# Patient Record
Sex: FEMALE | Race: WHITE | NOT HISPANIC OR LATINO | Employment: FULL TIME | URBAN - METROPOLITAN AREA
[De-identification: names, ages, dates, MRNs, and addresses within clinical notes are randomized per-mention and may not be internally consistent; named-entity substitution may affect disease eponyms.]

---

## 2018-11-20 ENCOUNTER — APPOINTMENT (OUTPATIENT)
Dept: OBGYN | Facility: CLINIC | Age: 28
End: 2018-11-20
Payer: COMMERCIAL

## 2018-11-20 ENCOUNTER — PRENATAL OFFICE VISIT (OUTPATIENT)
Dept: OBGYN | Facility: CLINIC | Age: 28
End: 2018-11-20

## 2018-11-20 DIAGNOSIS — Z34.00 PRENATAL CARE, FIRST PREGNANCY: Primary | ICD-10-CM

## 2018-11-20 PROCEDURE — 99207 ZZC NO CHARGE NURSE ONLY: CPT | Performed by: OBSTETRICS & GYNECOLOGY

## 2018-11-20 RX ORDER — PRENATAL VIT/IRON FUM/FOLIC AC 27MG-0.8MG
1 TABLET ORAL DAILY
COMMUNITY

## 2018-11-28 ENCOUNTER — PRENATAL OFFICE VISIT (OUTPATIENT)
Dept: OBGYN | Facility: CLINIC | Age: 28
End: 2018-11-28
Payer: COMMERCIAL

## 2018-11-28 VITALS
BODY MASS INDEX: 28 KG/M2 | WEIGHT: 164 LBS | DIASTOLIC BLOOD PRESSURE: 78 MMHG | HEIGHT: 64 IN | HEART RATE: 87 BPM | TEMPERATURE: 97.8 F | RESPIRATION RATE: 16 BRPM | SYSTOLIC BLOOD PRESSURE: 132 MMHG

## 2018-11-28 DIAGNOSIS — Z23 NEED FOR PROPHYLACTIC VACCINATION AND INOCULATION AGAINST INFLUENZA: ICD-10-CM

## 2018-11-28 DIAGNOSIS — Z34.01 ENCOUNTER FOR PRENATAL CARE IN FIRST TRIMESTER OF FIRST PREGNANCY: Primary | ICD-10-CM

## 2018-11-28 LAB
ABO + RH BLD: NORMAL
ABO + RH BLD: NORMAL
ALBUMIN UR-MCNC: NEGATIVE MG/DL
AMORPH CRY #/AREA URNS HPF: ABNORMAL /HPF
APPEARANCE UR: ABNORMAL
BILIRUB UR QL STRIP: NEGATIVE
BLD GP AB SCN SERPL QL: NORMAL
BLOOD BANK CMNT PATIENT-IMP: NORMAL
COLOR UR AUTO: YELLOW
ERYTHROCYTE [DISTWIDTH] IN BLOOD BY AUTOMATED COUNT: 11.4 % (ref 10–15)
GLUCOSE UR STRIP-MCNC: NEGATIVE MG/DL
HCT VFR BLD AUTO: 38.1 % (ref 35–47)
HGB BLD-MCNC: 13.2 G/DL (ref 11.7–15.7)
HGB UR QL STRIP: ABNORMAL
KETONES UR STRIP-MCNC: NEGATIVE MG/DL
LEUKOCYTE ESTERASE UR QL STRIP: NEGATIVE
MCH RBC QN AUTO: 31.2 PG (ref 26.5–33)
MCHC RBC AUTO-ENTMCNC: 34.6 G/DL (ref 31.5–36.5)
MCV RBC AUTO: 90 FL (ref 78–100)
NITRATE UR QL: NEGATIVE
NON-SQ EPI CELLS #/AREA URNS LPF: ABNORMAL /LPF
PH UR STRIP: 7 PH (ref 5–7)
PLATELET # BLD AUTO: 277 10E9/L (ref 150–450)
RBC # BLD AUTO: 4.23 10E12/L (ref 3.8–5.2)
RBC #/AREA URNS AUTO: ABNORMAL /HPF
SOURCE: ABNORMAL
SP GR UR STRIP: 1.02 (ref 1–1.03)
SPECIMEN EXP DATE BLD: NORMAL
UROBILINOGEN UR STRIP-ACNC: 0.2 EU/DL (ref 0.2–1)
WBC # BLD AUTO: 9.9 10E9/L (ref 4–11)
WBC #/AREA URNS AUTO: ABNORMAL /HPF

## 2018-11-28 PROCEDURE — 86901 BLOOD TYPING SEROLOGIC RH(D): CPT | Performed by: OBSTETRICS & GYNECOLOGY

## 2018-11-28 PROCEDURE — 87389 HIV-1 AG W/HIV-1&-2 AB AG IA: CPT | Performed by: OBSTETRICS & GYNECOLOGY

## 2018-11-28 PROCEDURE — 90686 IIV4 VACC NO PRSV 0.5 ML IM: CPT | Performed by: OBSTETRICS & GYNECOLOGY

## 2018-11-28 PROCEDURE — 81001 URINALYSIS AUTO W/SCOPE: CPT | Performed by: OBSTETRICS & GYNECOLOGY

## 2018-11-28 PROCEDURE — 87491 CHLMYD TRACH DNA AMP PROBE: CPT | Performed by: OBSTETRICS & GYNECOLOGY

## 2018-11-28 PROCEDURE — 99207 ZZC FIRST OB VISIT: CPT | Performed by: OBSTETRICS & GYNECOLOGY

## 2018-11-28 PROCEDURE — 90471 IMMUNIZATION ADMIN: CPT | Performed by: OBSTETRICS & GYNECOLOGY

## 2018-11-28 PROCEDURE — 86780 TREPONEMA PALLIDUM: CPT | Performed by: OBSTETRICS & GYNECOLOGY

## 2018-11-28 PROCEDURE — G0145 SCR C/V CYTO,THINLAYER,RESCR: HCPCS | Performed by: OBSTETRICS & GYNECOLOGY

## 2018-11-28 PROCEDURE — 85027 COMPLETE CBC AUTOMATED: CPT | Performed by: OBSTETRICS & GYNECOLOGY

## 2018-11-28 PROCEDURE — 86850 RBC ANTIBODY SCREEN: CPT | Performed by: OBSTETRICS & GYNECOLOGY

## 2018-11-28 PROCEDURE — 87086 URINE CULTURE/COLONY COUNT: CPT | Performed by: OBSTETRICS & GYNECOLOGY

## 2018-11-28 PROCEDURE — 87591 N.GONORRHOEAE DNA AMP PROB: CPT | Performed by: OBSTETRICS & GYNECOLOGY

## 2018-11-28 PROCEDURE — 86762 RUBELLA ANTIBODY: CPT | Performed by: OBSTETRICS & GYNECOLOGY

## 2018-11-28 PROCEDURE — 86900 BLOOD TYPING SEROLOGIC ABO: CPT | Performed by: OBSTETRICS & GYNECOLOGY

## 2018-11-28 PROCEDURE — 87340 HEPATITIS B SURFACE AG IA: CPT | Performed by: OBSTETRICS & GYNECOLOGY

## 2018-11-28 PROCEDURE — 36415 COLL VENOUS BLD VENIPUNCTURE: CPT | Performed by: OBSTETRICS & GYNECOLOGY

## 2018-11-28 NOTE — PROGRESS NOTES

## 2018-11-28 NOTE — NURSING NOTE
"Initial /78 (BP Location: Right arm, Patient Position: Chair, Cuff Size: Adult Regular)  Pulse 87  Temp 97.8  F (36.6  C) (Tympanic)  Resp 16  Ht 5' 4\" (1.626 m)  Wt 164 lb (74.4 kg)  BMI 28.15 kg/m2 Estimated body mass index is 28.15 kg/(m^2) as calculated from the following:    Height as of this encounter: 5' 4\" (1.626 m).    Weight as of this encounter: 164 lb (74.4 kg). .      "

## 2018-11-28 NOTE — PROGRESS NOTES
[facility]   OB/GYN Clinic    CC: New OB     Subjective:    Darlene is a 28 year old  at 10w5d by stated MARIA ALEJANDRA based on embryo transfer date who presents for her initial OB visit. This is a unplanned pregnancy and her and her partner are excited. She did IVF through [facility]. Dr. Veras was her CESAR. She reports feeling well. Denies any uterine cramping, abdominal pain or vaginal bleeding. Has nausea and no vomiting. Did single embryo transfer. Her first transfer failed. This was her 2nd transfer.     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       Past Surgical History:   Procedure Laterality Date     SURGICAL HISTORY OF -       egg retrieval for IVF       Current Outpatient Prescriptions   Medication Sig Dispense Refill     aspirin 81 MG tablet Take 81 mg by mouth daily       FOLIC ACID PO Take 400 mcg by mouth daily       Prenatal Vit-Fe Fumarate-FA (PRENATAL MULTIVITAMIN PLUS IRON) 27-0.8 MG TABS per tablet Take 1 tablet by mouth daily       VITAMIN D, CHOLECALCIFEROL, PO Take 2,000 Units by mouth daily         Family History   Problem Relation Age of Onset     Heart Surgery Maternal Grandmother      Melanoma Maternal Grandfather      Coronary Artery Disease Paternal Grandfather      MI       Social History   Substance Use Topics     Smoking status: Never Smoker     Smokeless tobacco: Never Used     Alcohol use Yes      Comment: 1-2- drink weekly quit with pregnancy       ROS: A 10 pt ROS was completed and found to be negative unless mentioned in the HPI.     Objective:  /78 (BP Location: Right arm, Patient Position: Chair, Cuff Size: Adult Regular)  Pulse 87  Temp 97.8  F (36.6  C) (Tympanic)  Resp 16  Ht 5'  "4\" (1.626 m)  Wt 164 lb (74.4 kg)  BMI 28.15 kg/m2    Estimated body mass index is 28.15 kg/(m^2) as calculated from the following:    Height as of this encounter: 5' 4\" (1.626 m).    Weight as of this encounter: 164 lb (74.4 kg).    Physical Exam:  Gen: Pleasant, talkative female in no apparent distress   Respiratory: Lungs clear, breathing comfortably on room air   Cardiac: Regular rate and rhythm with no murmurs, gallops or rubs. Warm and well-perfused.   GI: Abd soft and non-tender  : External genitalia is free of lesion. Urethra and bartholin glands normal.  Vaginal mucosa is moist and pink without unusual discharge.  Cervix is without lesions or discharge.  Bimanual exam reveals mobile anteverted uterus without cervical motion tenderness.  Adenexa are mobile and non-tender bilaterally. No appreciable adnexal enlargement. Uterus is consistent with a 11 week gestation.   MSK: Grossly normal movement of all four extremities  Psych: mood and affect bright   Lower extremity: edema not present     Fetal dop tones: 150 bpm  Fundal height: c/w dates    Assessment/Plan:   28 year old  at 10w5d by stated MARIA ALEJANDRA from embryo transfer date who presents for her initial OB visit.   1) Plan to draw new OB lab today (T&S, CBC, HIV, RPR, HepBsAg, Hep B antibody, rubella, GC/Chlam, UC)  2) Discussed routine prenatal care, group practice model at [facility], tertiary support and frequency of visits. Options for  testing for chromosomal and birth defects were discussed with the patient including nuchal translucency/blood marker testing in the first trimester, progenity and quad screening and/or Level 2 ultrasound in the second trimester. We discussed that these are screening tests and not diagnostic tests and that false positives and negatives are a distinct possibility. We discussed that follow up diagnostic testing would include chorionic villus sampling or amniocentesis depending on gestational age. Written " information provided. Patient declines genetic testing. Discussed risk of zika infection with travel and subsequent pregnancy risks. Referred to CDC for further information.   3) Anatomy scan at 20wks  4) Concerns: IVF pregnancy - offered fetal ECHO 2nd trimester, pt will discuss with partner  5) PMH/OBHx problems: none  6) Medication review: no changes, continue prenatal vitamin   7) Reviewed ectopic and miscarriage precautions (present to ED or call clinic with abdominal pain, vaginal bleeding or fever)   8) Weight gain: discussed weight gain expectations (BMI 25-30: 15-25lbs)   9) PAP smear: due today  10) Delivery plan: continue to discuss, breastfeeding, pp contraception, pain management in labor - continue to discuss  11) Immunizations: flu shot - due today, Tdap at 28wks  12) No increased risk for gestational diabetes for plan for screen at 28wks     Return to clinic in 4 weeks.     Dinah Rossi MD  OB/GYN  11/28/2018

## 2018-11-28 NOTE — PATIENT INSTRUCTIONS
1) Have your flu shot.   2) We will escort you down to the lab to have your blood drawn.   3) Return to clinic in 4 weeks.   Congratulations! It was a pleasure to meet you!    - Dr. Rossi

## 2018-11-28 NOTE — LETTER
December 4, 2018      Darlene Johnson  [id number] [address] [facility] [address]    Dear ,      I am happy to inform you that your recent cervical cancer screening test (PAP smear) was normal.      Preventative screenings such as this help to ensure your health for years to come. You should repeat a pap smear in 3 years, unless otherwise directed.      You will still need to return to the clinic every year for your annual exam and other preventive tests.     If you have additional questions regarding this result, please call our registered nurse, Yani at [phone].      Sincerely,      Dinah Rossi MD/yoshi

## 2018-11-28 NOTE — MR AVS SNAPSHOT
After Visit Summary   11/28/2018    Darlene Johnson    MRN: [id number]           Patient Information     Date Of Birth          1990        Visit Information        Provider Department      11/28/2018 2:00 PM Dinah Rossi MD; [facility]        Today's Diagnoses     NO SHOW    -  1      Care Instructions    1) Have your flu shot.   2) We will escort you down to the lab to have your blood drawn.   3) Return to clinic in 4 weeks.   Congratulations! It was a pleasure to meet you!    - Dr. Rossi           Follow-ups after your visit        Who to contact     If you have questions or need follow up information about today's clinic visit or your schedule please contact [facility] directly at [phone].  Normal or non-critical lab and imaging results will be communicated to you by MyChart, letter or phone within 4 business days after the clinic has received the results. If you do not hear from us within 7 days, please contact the clinic through MyChart or phone. If you have a critical or abnormal lab result, we will notify you by phone as soon as possible.  Submit refill requests through Accountable or call your pharmacy and they will forward the refill request to us. Please allow 3 business days for your refill to be completed.          Additional Information About Your Visit        MyChart Information     Accountable gives you secure access to your electronic health record. If you see a primary care provider, you can also send messages to your care team and make appointments. If you have questions, please call your primary care clinic.  If you do not have a primary care provider, please call [phone] and they will assist you.        Care EveryWhere ID     This is your Care EveryWhere ID. This could be used by other organizations to access your [place] medical records  [id number]        Your Vitals Were     Pulse Temperature Respirations Height  "BMI (Body Mass Index)       87 97.8  F (36.6  C) (Tympanic) 16 5' 4\" (1.626 m) 28.15 kg/m2        Blood Pressure from Last 3 Encounters:   11/28/18 132/78    Weight from Last 3 Encounters:   11/28/18 164 lb (74.4 kg)              We Performed the Following     NO SHOW CHARGE        Primary Care Provider    None Specified       No primary provider on file.        Equal Access to Services     ABI MCCRACKEN : Hadii sergio dahlo Socosta, waaxda luqadaha, qaybta kaalmada elvia, minal jeffersonalishalorena jalloh . So [facility] [phone].    ATENCIÓN: Si aleah hernandez, tiene a riley disposición servicios gratuitos de asistencia lingüística. Llame al [phone].    We comply with applicable federal civil rights laws and [place] laws. We do not discriminate on the basis of race, color, national origin, age, disability, sex, sexual orientation, or gender identity.            Thank you!     Thank you for choosing [facility]  for your care. Our goal is always to provide you with excellent care. Hearing back from our patients is one way we can continue to improve our services. Please take a few minutes to complete the written survey that you may receive in the mail after your visit with us. Thank you!             Your Updated Medication List - Protect others around you: Learn how to safely use, store and throw away your medicines at www.disposemymeds.org.          This list is accurate as of 11/28/18  2:55 PM.  Always use your most recent med list.                   Brand Name Dispense Instructions for use Diagnosis    aspirin 81 MG tablet    ASA     Take 81 mg by mouth daily        FOLIC ACID PO      Take 400 mcg by mouth daily        prenatal multivitamin w/iron 27-0.8 MG tablet      Take 1 tablet by mouth daily        VITAMIN D (CHOLECALCIFEROL) PO      Take 2,000 Units by mouth daily          "

## 2018-11-29 LAB
BACTERIA SPEC CULT: NO GROWTH
C TRACH DNA SPEC QL NAA+PROBE: NEGATIVE
HBV SURFACE AG SERPL QL IA: NONREACTIVE
HIV 1+2 AB+HIV1 P24 AG SERPL QL IA: NONREACTIVE
Lab: NORMAL
N GONORRHOEA DNA SPEC QL NAA+PROBE: NEGATIVE
RUBV IGG SERPL IA-ACNC: 16 IU/ML
SPECIMEN SOURCE: NORMAL
T PALLIDUM AB SER QL: NONREACTIVE

## 2018-11-30 LAB
COPATH REPORT: NORMAL
PAP: NORMAL

## 2018-12-31 ENCOUNTER — PRENATAL OFFICE VISIT (OUTPATIENT)
Dept: OBGYN | Facility: CLINIC | Age: 28
End: 2018-12-31
Payer: COMMERCIAL

## 2018-12-31 VITALS
SYSTOLIC BLOOD PRESSURE: 109 MMHG | WEIGHT: 166 LBS | HEART RATE: 68 BPM | TEMPERATURE: 97.6 F | BODY MASS INDEX: 28.49 KG/M2 | DIASTOLIC BLOOD PRESSURE: 62 MMHG

## 2018-12-31 DIAGNOSIS — Z34.02 ENCOUNTER FOR PRENATAL CARE IN SECOND TRIMESTER OF FIRST PREGNANCY: Primary | ICD-10-CM

## 2018-12-31 DIAGNOSIS — Z34.02 ENCOUNTER FOR SUPERVISION OF NORMAL FIRST PREGNANCY IN SECOND TRIMESTER: ICD-10-CM

## 2018-12-31 PROCEDURE — 99207 ZZC PRENATAL VISIT: CPT | Performed by: OBSTETRICS & GYNECOLOGY

## 2018-12-31 NOTE — PROGRESS NOTES
Concerns:   Doing well.  No concerns today.  Discussed anenatal screening.  She declines testing at this time.  Reportable signs and symptoms discussed.  Will schedule anatomy ultrasound.  RTC 4 weeks.      Miguel Kellogg MD

## 2018-12-31 NOTE — NURSING NOTE
"Initial /62 (BP Location: Right arm, Patient Position: Chair, Cuff Size: Adult Large)   Pulse 68   Temp 97.6  F (36.4  C) (Tympanic)   Wt 75.3 kg (166 lb)   BMI 28.49 kg/m   Estimated body mass index is 28.49 kg/m  as calculated from the following:    Height as of 11/28/18: 1.626 m (5' 4\").    Weight as of this encounter: 75.3 kg (166 lb). .    Swapna Ugalde    "

## 2019-02-01 ENCOUNTER — HOSPITAL ENCOUNTER (OUTPATIENT)
Dept: ULTRASOUND IMAGING | Facility: CLINIC | Age: 29
Discharge: HOME | End: 2019-02-01
Attending: OBSTETRICS & GYNECOLOGY | Admitting: OBSTETRICS & GYNECOLOGY
Payer: COMMERCIAL

## 2019-02-01 ENCOUNTER — PRENATAL OFFICE VISIT (OUTPATIENT)
Dept: OBGYN | Facility: CLINIC | Age: 29
End: 2019-02-01
Payer: COMMERCIAL

## 2019-02-01 VITALS
DIASTOLIC BLOOD PRESSURE: 70 MMHG | SYSTOLIC BLOOD PRESSURE: 122 MMHG | BODY MASS INDEX: 29.64 KG/M2 | HEIGHT: 64 IN | TEMPERATURE: 98.3 F | WEIGHT: 173.6 LBS | RESPIRATION RATE: 16 BRPM | HEART RATE: 68 BPM

## 2019-02-01 DIAGNOSIS — Z34.02 ENCOUNTER FOR PRENATAL CARE IN SECOND TRIMESTER OF FIRST PREGNANCY: ICD-10-CM

## 2019-02-01 DIAGNOSIS — Z34.02 ENCOUNTER FOR PRENATAL CARE IN SECOND TRIMESTER OF FIRST PREGNANCY: Primary | ICD-10-CM

## 2019-02-01 PROCEDURE — 99207 ZZC PRENATAL VISIT: CPT | Performed by: OBSTETRICS & GYNECOLOGY

## 2019-02-01 PROCEDURE — 76805 OB US >/= 14 WKS SNGL FETUS: CPT

## 2019-02-01 ASSESSMENT — MIFFLIN-ST. JEOR: SCORE: 1502.44

## 2019-02-01 NOTE — PROGRESS NOTES
Doing well, had anatomy US today and has gender in an envelope.  No FM yet.     28 year old  at 20w0d   - anatomy US today: final result not yet in Epic  - reviewed quickening  - GCT next visit    RTC 4 weeks    Shira Brand MD, MPH  [facility]

## 2019-02-01 NOTE — NURSING NOTE
"Initial /70 (BP Location: Right arm, Patient Position: Sitting, Cuff Size: Adult Regular)   Pulse 68   Temp 98.3  F (36.8  C) (Tympanic)   Resp 16   Ht 1.626 m (5' 4\")   Wt 78.7 kg (173 lb 9.6 oz)   Breastfeeding? No   BMI 29.80 kg/m   Estimated body mass index is 29.8 kg/m  as calculated from the following:    Height as of this encounter: 1.626 m (5' 4\").    Weight as of this encounter: 78.7 kg (173 lb 9.6 oz). .    Yani Lee, [facility]    "

## 2019-02-26 ENCOUNTER — PRENATAL OFFICE VISIT (OUTPATIENT)
Dept: OBGYN | Facility: CLINIC | Age: 29
End: 2019-02-26
Payer: COMMERCIAL

## 2019-02-26 VITALS
HEIGHT: 64 IN | DIASTOLIC BLOOD PRESSURE: 56 MMHG | RESPIRATION RATE: 16 BRPM | HEART RATE: 71 BPM | WEIGHT: 178.2 LBS | SYSTOLIC BLOOD PRESSURE: 121 MMHG | TEMPERATURE: 98.1 F | BODY MASS INDEX: 30.42 KG/M2

## 2019-02-26 DIAGNOSIS — Z34.02 ENCOUNTER FOR PRENATAL CARE IN SECOND TRIMESTER OF FIRST PREGNANCY: Primary | ICD-10-CM

## 2019-02-26 DIAGNOSIS — Z34.02 ENCOUNTER FOR PRENATAL CARE IN SECOND TRIMESTER OF FIRST PREGNANCY: ICD-10-CM

## 2019-02-26 LAB
ERYTHROCYTE [DISTWIDTH] IN BLOOD BY AUTOMATED COUNT: 11.8 % (ref 10–15)
GLUCOSE 1H P 50 G GLC PO SERPL-MCNC: 85 MG/DL (ref 60–129)
HCT VFR BLD AUTO: 34.5 % (ref 35–47)
HGB BLD-MCNC: 11.9 G/DL (ref 11.7–15.7)
MCH RBC QN AUTO: 32.5 PG (ref 26.5–33)
MCHC RBC AUTO-ENTMCNC: 34.5 G/DL (ref 31.5–36.5)
MCV RBC AUTO: 94 FL (ref 78–100)
PLATELET # BLD AUTO: 231 10E9/L (ref 150–450)
RBC # BLD AUTO: 3.66 10E12/L (ref 3.8–5.2)
WBC # BLD AUTO: 10.2 10E9/L (ref 4–11)

## 2019-02-26 PROCEDURE — 0064U ANTB TP TOTAL&RPR IA QUAL: CPT | Performed by: OBSTETRICS & GYNECOLOGY

## 2019-02-26 PROCEDURE — 85027 COMPLETE CBC AUTOMATED: CPT | Performed by: OBSTETRICS & GYNECOLOGY

## 2019-02-26 PROCEDURE — 99207 ZZC PRENATAL VISIT: CPT | Performed by: OBSTETRICS & GYNECOLOGY

## 2019-02-26 PROCEDURE — 82950 GLUCOSE TEST: CPT | Performed by: OBSTETRICS & GYNECOLOGY

## 2019-02-26 PROCEDURE — 36415 COLL VENOUS BLD VENIPUNCTURE: CPT | Performed by: OBSTETRICS & GYNECOLOGY

## 2019-02-26 ASSESSMENT — MIFFLIN-ST. JEOR: SCORE: 1523.31

## 2019-02-26 NOTE — NURSING NOTE
"Initial /56 (BP Location: Right arm, Patient Position: Sitting, Cuff Size: Adult Regular)   Pulse 71   Temp 98.1  F (36.7  C) (Tympanic)   Resp 16   Ht 1.626 m (5' 4\")   Wt 80.8 kg (178 lb 3.2 oz)   Breastfeeding? No   BMI 30.59 kg/m   Estimated body mass index is 30.59 kg/m  as calculated from the following:    Height as of this encounter: 1.626 m (5' 4\").    Weight as of this encounter: 80.8 kg (178 lb 3.2 oz). .    Yani Lee, [facility]    "

## 2019-02-26 NOTE — PROGRESS NOTES
Doing well.  +FM, no ctx, no VB or LOF.    28 year old  at 23w4d   - GCT today  - TDaP next visit  - gave printed results of US; it's a GIRL    RTC 4 weeks    Shira Brand MD, MPH  [facility]

## 2019-02-27 LAB — T PALLIDUM AB SER QL: NONREACTIVE

## 2019-03-26 ENCOUNTER — PRENATAL OFFICE VISIT (OUTPATIENT)
Dept: OBGYN | Facility: CLINIC | Age: 29
End: 2019-03-26
Payer: COMMERCIAL

## 2019-03-26 VITALS
WEIGHT: 185.56 LBS | BODY MASS INDEX: 31.68 KG/M2 | DIASTOLIC BLOOD PRESSURE: 69 MMHG | HEART RATE: 75 BPM | TEMPERATURE: 98.4 F | HEIGHT: 64 IN | SYSTOLIC BLOOD PRESSURE: 118 MMHG | RESPIRATION RATE: 16 BRPM

## 2019-03-26 DIAGNOSIS — Z34.02 ENCOUNTER FOR PRENATAL CARE IN SECOND TRIMESTER OF FIRST PREGNANCY: Primary | ICD-10-CM

## 2019-03-26 PROCEDURE — 99207 ZZC PRENATAL VISIT: CPT | Performed by: OBSTETRICS & GYNECOLOGY

## 2019-03-26 PROCEDURE — 90715 TDAP VACCINE 7 YRS/> IM: CPT | Performed by: OBSTETRICS & GYNECOLOGY

## 2019-03-26 PROCEDURE — 90471 IMMUNIZATION ADMIN: CPT | Performed by: OBSTETRICS & GYNECOLOGY

## 2019-03-26 ASSESSMENT — MIFFLIN-ST. JEOR: SCORE: 1556.69

## 2019-03-26 NOTE — NURSING NOTE
"Initial /69 (BP Location: Right arm, Patient Position: Sitting, Cuff Size: Adult Regular)   Pulse 75   Temp 98.4  F (36.9  C) (Tympanic)   Resp 16   Ht 1.626 m (5' 4\")   Wt 84.2 kg (185 lb 9 oz)   Breastfeeding? No   BMI 31.85 kg/m   Estimated body mass index is 31.85 kg/m  as calculated from the following:    Height as of this encounter: 1.626 m (5' 4\").    Weight as of this encounter: 84.2 kg (185 lb 9 oz). .    Yani Lee, [facility]    "

## 2019-03-26 NOTE — PROGRESS NOTES
Doing well.  +FM, no ctx, no VB or LOF.    28 year old  at 27w4d   - TDaP today  - planning to breastfeed, considering LARC postpartum.  Had IVF for low sperm count.   - reviewed s/s PTL, gave # for BC    RTC 2 weeks    Shira Brand MD, MPH  [facility]

## 2019-04-09 ENCOUNTER — PRENATAL OFFICE VISIT (OUTPATIENT)
Dept: OBGYN | Facility: CLINIC | Age: 29
End: 2019-04-09
Payer: COMMERCIAL

## 2019-04-09 VITALS
HEIGHT: 64 IN | HEART RATE: 80 BPM | SYSTOLIC BLOOD PRESSURE: 111 MMHG | WEIGHT: 185.9 LBS | TEMPERATURE: 97.1 F | BODY MASS INDEX: 31.74 KG/M2 | RESPIRATION RATE: 16 BRPM | DIASTOLIC BLOOD PRESSURE: 63 MMHG

## 2019-04-09 DIAGNOSIS — Z34.03 ENCOUNTER FOR PRENATAL CARE IN THIRD TRIMESTER OF FIRST PREGNANCY: Primary | ICD-10-CM

## 2019-04-09 PROCEDURE — 99207 ZZC PRENATAL VISIT: CPT | Performed by: OBSTETRICS & GYNECOLOGY

## 2019-04-09 ASSESSMENT — MIFFLIN-ST. JEOR: SCORE: 1558.24

## 2019-04-09 NOTE — PROGRESS NOTES
Doing well, has two baby showers coming up.  +FM, no ctx, no VB or LOF.    28 year old  at 29w4d   - s/p TDaP last visit  - discussed GBS and cvx chk at 36 week visit     RTC 2 weeks    Shira Brand MD, MPH  [facility]

## 2019-04-09 NOTE — NURSING NOTE
"Initial /63 (BP Location: Right arm, Patient Position: Sitting, Cuff Size: Adult Regular)   Pulse 80   Temp 97.1  F (36.2  C) (Tympanic)   Resp 16   Ht 1.626 m (5' 4\")   Wt 84.3 kg (185 lb 14.4 oz)   Breastfeeding? No   BMI 31.91 kg/m   Estimated body mass index is 31.91 kg/m  as calculated from the following:    Height as of this encounter: 1.626 m (5' 4\").    Weight as of this encounter: 84.3 kg (185 lb 14.4 oz). .    Yani Lee, [facility]    "

## 2019-04-23 ENCOUNTER — PRENATAL OFFICE VISIT (OUTPATIENT)
Dept: OBGYN | Facility: CLINIC | Age: 29
End: 2019-04-23
Payer: COMMERCIAL

## 2019-04-23 VITALS
HEART RATE: 78 BPM | BODY MASS INDEX: 32.44 KG/M2 | WEIGHT: 189 LBS | DIASTOLIC BLOOD PRESSURE: 64 MMHG | TEMPERATURE: 96.2 F | SYSTOLIC BLOOD PRESSURE: 129 MMHG

## 2019-04-23 DIAGNOSIS — Z34.03 ENCOUNTER FOR PRENATAL CARE IN THIRD TRIMESTER OF FIRST PREGNANCY: Primary | ICD-10-CM

## 2019-04-23 PROCEDURE — 99207 ZZC PRENATAL VISIT: CPT | Performed by: OBSTETRICS & GYNECOLOGY

## 2019-04-23 NOTE — NURSING NOTE
"Initial /64 (BP Location: Left arm, Patient Position: Chair, Cuff Size: Adult Regular)   Pulse 78   Temp 96.2  F (35.7  C) (Tympanic)   Wt 85.7 kg (189 lb)   BMI 32.44 kg/m   Estimated body mass index is 32.44 kg/m  as calculated from the following:    Height as of 4/9/19: 1.626 m (5' 4\").    Weight as of this encounter: 85.7 kg (189 lb). .    Swapna Ugalde    "

## 2019-04-23 NOTE — PROGRESS NOTES
Doing well.   Had a cough start over the weekend; inquires about OTC meds that she is okay to take.   Denies VB, ctx, LOF. +FM  /64 (BP Location: Left arm, Patient Position: Chair, Cuff Size: Adult Regular)   Pulse 78   Temp 96.2  F (35.7  C) (Tympanic)   Wt 85.7 kg (189 lb)   BMI 32.44 kg/m    General Appearance: NAD  Abdomen: Gravid, NT  Refer to flow sheet above.   A/P: 28 year old  at 31w4d  -- concerns addressed above, reassurance provided; conservative OTC meds reviewed   -- growth US ordered for S>D  -- PTL precautions reviewed  RTC in 2 weeks    Concetta Darby MD  [facility]

## 2019-04-26 ENCOUNTER — HOSPITAL ENCOUNTER (OUTPATIENT)
Dept: ULTRASOUND IMAGING | Facility: CLINIC | Age: 29
Discharge: HOME | End: 2019-04-26
Attending: OBSTETRICS & GYNECOLOGY | Admitting: OBSTETRICS & GYNECOLOGY
Payer: COMMERCIAL

## 2019-04-26 DIAGNOSIS — Z34.03 ENCOUNTER FOR PRENATAL CARE IN THIRD TRIMESTER OF FIRST PREGNANCY: ICD-10-CM

## 2019-04-26 PROCEDURE — 76816 OB US FOLLOW-UP PER FETUS: CPT

## 2019-05-07 ENCOUNTER — PRENATAL OFFICE VISIT (OUTPATIENT)
Dept: OBGYN | Facility: CLINIC | Age: 29
End: 2019-05-07
Payer: COMMERCIAL

## 2019-05-07 VITALS
DIASTOLIC BLOOD PRESSURE: 80 MMHG | HEIGHT: 64 IN | TEMPERATURE: 98.2 F | SYSTOLIC BLOOD PRESSURE: 116 MMHG | WEIGHT: 190.4 LBS | RESPIRATION RATE: 16 BRPM | BODY MASS INDEX: 32.5 KG/M2 | HEART RATE: 86 BPM

## 2019-05-07 DIAGNOSIS — Z34.03 ENCOUNTER FOR PRENATAL CARE IN THIRD TRIMESTER OF FIRST PREGNANCY: Primary | ICD-10-CM

## 2019-05-07 PROCEDURE — 99207 ZZC PRENATAL VISIT: CPT | Performed by: OBSTETRICS & GYNECOLOGY

## 2019-05-07 ASSESSMENT — MIFFLIN-ST. JEOR: SCORE: 1578.65

## 2019-05-07 NOTE — NURSING NOTE
"Initial /80 (BP Location: Right arm, Patient Position: Sitting, Cuff Size: Adult Regular)   Pulse 86   Temp 98.2  F (36.8  C) (Tympanic)   Resp 16   Ht 1.626 m (5' 4\")   Wt 86.4 kg (190 lb 6.4 oz)   Breastfeeding? No   BMI 32.68 kg/m   Estimated body mass index is 32.68 kg/m  as calculated from the following:    Height as of this encounter: 1.626 m (5' 4\").    Weight as of this encounter: 86.4 kg (190 lb 6.4 oz). .    Yani Lee, [facility]    "

## 2019-05-07 NOTE — PROGRESS NOTES
Doing well.  +FM, no ctx, no VB or LOF.    28 year old  at 33w4d   - GBS and cvx chk next visit  - reviewed s/s labor    RTC 2 weeks    Shira Brand MD, MPH  [facility]

## 2019-05-21 ENCOUNTER — PRENATAL OFFICE VISIT (OUTPATIENT)
Dept: OBGYN | Facility: CLINIC | Age: 29
End: 2019-05-21
Payer: COMMERCIAL

## 2019-05-21 VITALS
DIASTOLIC BLOOD PRESSURE: 68 MMHG | HEIGHT: 64 IN | TEMPERATURE: 98.1 F | HEART RATE: 73 BPM | WEIGHT: 195.2 LBS | SYSTOLIC BLOOD PRESSURE: 123 MMHG | RESPIRATION RATE: 16 BRPM | BODY MASS INDEX: 33.32 KG/M2

## 2019-05-21 DIAGNOSIS — Z34.03 ENCOUNTER FOR PRENATAL CARE IN THIRD TRIMESTER OF FIRST PREGNANCY: Primary | ICD-10-CM

## 2019-05-21 PROCEDURE — 87653 STREP B DNA AMP PROBE: CPT | Performed by: OBSTETRICS & GYNECOLOGY

## 2019-05-21 PROCEDURE — 99207 ZZC PRENATAL VISIT: CPT | Performed by: OBSTETRICS & GYNECOLOGY

## 2019-05-21 ASSESSMENT — MIFFLIN-ST. JEOR: SCORE: 1595.42

## 2019-05-21 NOTE — PROGRESS NOTES
Doing well.  +FM, occ runs of ctx, no VB or LOF.    29 year old  at 35w4d   - GBS and cvx chk today  - discussed IOL after 41 weeks for post-dates or by maternal request with a favorable cervix after 39 weeks.  Discussed with Darlene Johnson, the following; indications; the agents and methods of labor augmentation, including risks, benefits, and alternative approaches; and the possible need for  birth. EFW is AGA.  The Labor Induction:what you need to know information sheet was made available to her. Questions and concerns were addressed and patient agrees to above if necessary during the course of her labor.      Shira Brand MD, MPH  [facility]

## 2019-05-21 NOTE — NURSING NOTE
"Initial /68 (BP Location: Right arm, Patient Position: Sitting, Cuff Size: Adult Regular)   Pulse 73   Temp 98.1  F (36.7  C)   Resp 16   Ht 1.626 m (5' 4\")   Wt 88.5 kg (195 lb 3.2 oz)   Breastfeeding? No   BMI 33.51 kg/m   Estimated body mass index is 33.51 kg/m  as calculated from the following:    Height as of this encounter: 1.626 m (5' 4\").    Weight as of this encounter: 88.5 kg (195 lb 3.2 oz). .    Yani Lee, [facility]    "

## 2019-05-22 LAB
GP B STREP DNA SPEC QL NAA+PROBE: NEGATIVE
SPECIMEN SOURCE: NORMAL

## 2019-05-28 ENCOUNTER — PRENATAL OFFICE VISIT (OUTPATIENT)
Dept: OBGYN | Facility: CLINIC | Age: 29
End: 2019-05-28
Payer: COMMERCIAL

## 2019-05-28 VITALS
BODY MASS INDEX: 33.36 KG/M2 | WEIGHT: 195.4 LBS | RESPIRATION RATE: 16 BRPM | HEART RATE: 75 BPM | TEMPERATURE: 97.4 F | SYSTOLIC BLOOD PRESSURE: 127 MMHG | DIASTOLIC BLOOD PRESSURE: 76 MMHG | HEIGHT: 64 IN

## 2019-05-28 DIAGNOSIS — Z34.03 ENCOUNTER FOR PRENATAL CARE IN THIRD TRIMESTER OF FIRST PREGNANCY: Primary | ICD-10-CM

## 2019-05-28 PROCEDURE — 99207 ZZC PRENATAL VISIT: CPT | Performed by: OBSTETRICS & GYNECOLOGY

## 2019-05-28 ASSESSMENT — MIFFLIN-ST. JEOR: SCORE: 1596.33

## 2019-05-28 NOTE — PROGRESS NOTES
Doing well, had a grad party out of town last weekend.  +FM, no real ctx, no VB or LOF.    29 year old  at 36w4d   - reviewed s/s labor, has # for BC    RTC weekly    Shira Brand MD, MPH  [facility]

## 2019-05-28 NOTE — NURSING NOTE
"Initial /76 (BP Location: Right arm, Patient Position: Sitting, Cuff Size: Adult Regular)   Pulse 75   Temp 97.4  F (36.3  C)   Resp 16   Ht 1.626 m (5' 4\")   Wt 88.6 kg (195 lb 6.4 oz)   Breastfeeding? No   BMI 33.54 kg/m   Estimated body mass index is 33.54 kg/m  as calculated from the following:    Height as of this encounter: 1.626 m (5' 4\").    Weight as of this encounter: 88.6 kg (195 lb 6.4 oz). .    Yani Lee, [facility]    "

## 2019-06-04 ENCOUNTER — PRENATAL OFFICE VISIT (OUTPATIENT)
Dept: OBGYN | Facility: CLINIC | Age: 29
End: 2019-06-04
Payer: COMMERCIAL

## 2019-06-04 VITALS
BODY MASS INDEX: 33.39 KG/M2 | WEIGHT: 195.6 LBS | HEIGHT: 64 IN | HEART RATE: 73 BPM | TEMPERATURE: 97 F | RESPIRATION RATE: 16 BRPM | SYSTOLIC BLOOD PRESSURE: 128 MMHG | DIASTOLIC BLOOD PRESSURE: 74 MMHG

## 2019-06-04 DIAGNOSIS — Z34.03 ENCOUNTER FOR PRENATAL CARE IN THIRD TRIMESTER OF FIRST PREGNANCY: Primary | ICD-10-CM

## 2019-06-04 PROCEDURE — 99207 ZZC PRENATAL VISIT: CPT | Performed by: OBSTETRICS & GYNECOLOGY

## 2019-06-04 ASSESSMENT — MIFFLIN-ST. JEOR: SCORE: 1597.24

## 2019-06-04 NOTE — PROGRESS NOTES
Doing well.  +FM, occ ctx, no VB or LOF.    29 year old  at 37w4d   - reviewed labor signs    RTC weekly    Shira Brand MD, MPH  [facility]

## 2019-06-04 NOTE — NURSING NOTE
"Initial /74 (BP Location: Right arm, Patient Position: Sitting, Cuff Size: Adult Regular)   Pulse 73   Temp 97  F (36.1  C) (Tympanic)   Resp 16   Ht 1.626 m (5' 4\")   Wt 88.7 kg (195 lb 9.6 oz)   Breastfeeding? No   BMI 33.57 kg/m   Estimated body mass index is 33.57 kg/m  as calculated from the following:    Height as of this encounter: 1.626 m (5' 4\").    Weight as of this encounter: 88.7 kg (195 lb 9.6 oz). .    Yani Lee, [facility]    "

## 2019-06-11 ENCOUNTER — PRENATAL OFFICE VISIT (OUTPATIENT)
Dept: OBGYN | Facility: CLINIC | Age: 29
End: 2019-06-11
Payer: COMMERCIAL

## 2019-06-11 VITALS
DIASTOLIC BLOOD PRESSURE: 80 MMHG | HEIGHT: 64 IN | HEART RATE: 66 BPM | BODY MASS INDEX: 33.09 KG/M2 | SYSTOLIC BLOOD PRESSURE: 123 MMHG | WEIGHT: 193.8 LBS | TEMPERATURE: 97.3 F | RESPIRATION RATE: 16 BRPM

## 2019-06-11 DIAGNOSIS — Z34.03 ENCOUNTER FOR PRENATAL CARE IN THIRD TRIMESTER OF FIRST PREGNANCY: Primary | ICD-10-CM

## 2019-06-11 PROCEDURE — 99207 ZZC PRENATAL VISIT: CPT | Performed by: OBSTETRICS & GYNECOLOGY

## 2019-06-11 ASSESSMENT — MIFFLIN-ST. JEOR: SCORE: 1589.07

## 2019-06-11 NOTE — NURSING NOTE
"Initial /80 (BP Location: Right arm, Patient Position: Sitting, Cuff Size: Adult Regular)   Pulse 66   Temp 97.3  F (36.3  C) (Tympanic)   Resp 16   Ht 1.626 m (5' 4\")   Wt 87.9 kg (193 lb 12.8 oz)   Breastfeeding? No   BMI 33.27 kg/m   Estimated body mass index is 33.27 kg/m  as calculated from the following:    Height as of this encounter: 1.626 m (5' 4\").    Weight as of this encounter: 87.9 kg (193 lb 12.8 oz). .    Yani Lee, [facility]    "

## 2019-06-11 NOTE — PROGRESS NOTES
Doing well.  +FM, no real ctx, no VB or LOF.    29 year old  at 38w4d   - attempted to strip membranes today, cervix very anterior    RTC weekly until delivery    Shira Brand MD, MPH  [facility]

## 2019-06-18 ENCOUNTER — PRENATAL OFFICE VISIT (OUTPATIENT)
Dept: OBGYN | Facility: CLINIC | Age: 29
End: 2019-06-18
Payer: COMMERCIAL

## 2019-06-18 VITALS
TEMPERATURE: 97.7 F | DIASTOLIC BLOOD PRESSURE: 82 MMHG | SYSTOLIC BLOOD PRESSURE: 121 MMHG | WEIGHT: 194.4 LBS | RESPIRATION RATE: 16 BRPM | HEART RATE: 75 BPM | HEIGHT: 64 IN | BODY MASS INDEX: 33.19 KG/M2

## 2019-06-18 DIAGNOSIS — Z34.03 ENCOUNTER FOR PRENATAL CARE IN THIRD TRIMESTER OF FIRST PREGNANCY: Primary | ICD-10-CM

## 2019-06-18 PROCEDURE — 99207 ZZC PRENATAL VISIT: CPT | Performed by: OBSTETRICS & GYNECOLOGY

## 2019-06-18 ASSESSMENT — MIFFLIN-ST. JEOR: SCORE: 1591.79

## 2019-06-18 NOTE — PROGRESS NOTES
No signs of baby quite yet.  +FM, no ctx, no VB or LOF.    29 year old  at 39w4d   - attempted to strip membranes again but cervix still very anterior  - reviewed IOL for PD at 41 weeks, plan for NST next visit at 40+4 if still pregnant    RTC 1 week    Shira Brand MD, MPH  [facility]

## 2019-06-18 NOTE — NURSING NOTE
"Initial /82 (BP Location: Right arm, Patient Position: Sitting, Cuff Size: Adult Regular)   Pulse 75   Temp 97.7  F (36.5  C) (Tympanic)   Resp 16   Ht 1.626 m (5' 4\")   Wt 88.2 kg (194 lb 6.4 oz)   Breastfeeding? No   BMI 33.37 kg/m   Estimated body mass index is 33.37 kg/m  as calculated from the following:    Height as of this encounter: 1.626 m (5' 4\").    Weight as of this encounter: 88.2 kg (194 lb 6.4 oz). .    Yani Lee, [facility]    "

## 2019-06-23 ENCOUNTER — HOSPITAL ENCOUNTER (INPATIENT)
Facility: CLINIC | Age: 29
LOS: 3 days | Discharge: HOME | End: 2019-06-26
Attending: OBSTETRICS & GYNECOLOGY | Admitting: OBSTETRICS & GYNECOLOGY
Payer: COMMERCIAL

## 2019-06-23 ENCOUNTER — ANESTHESIA EVENT (OUTPATIENT)
Dept: OBGYN | Facility: CLINIC | Age: 29
End: 2019-06-23
Payer: COMMERCIAL

## 2019-06-23 ENCOUNTER — ANESTHESIA EVENT (OUTPATIENT)
Dept: SURGERY | Facility: CLINIC | Age: 29
End: 2019-06-23
Payer: COMMERCIAL

## 2019-06-23 ENCOUNTER — ANESTHESIA (OUTPATIENT)
Dept: SURGERY | Facility: CLINIC | Age: 29
End: 2019-06-23
Payer: COMMERCIAL

## 2019-06-23 ENCOUNTER — ANESTHESIA (OUTPATIENT)
Dept: OBGYN | Facility: CLINIC | Age: 29
End: 2019-06-23
Payer: COMMERCIAL

## 2019-06-23 DIAGNOSIS — Z98.891 S/P CESAREAN SECTION: Primary | ICD-10-CM

## 2019-06-23 PROBLEM — Z34.00 SUPERVISION OF NORMAL FIRST PREGNANCY: Status: ACTIVE | Noted: 2019-06-23

## 2019-06-23 PROBLEM — Z37.9 NORMAL LABOR: Status: ACTIVE | Noted: 2019-06-23

## 2019-06-23 LAB
ABO + RH BLD: NORMAL
ABO + RH BLD: NORMAL
BASOPHILS # BLD AUTO: 0.1 10E9/L (ref 0–0.2)
BASOPHILS NFR BLD AUTO: 0.3 %
BLD GP AB SCN SERPL QL: NORMAL
BLOOD BANK CMNT PATIENT-IMP: NORMAL
DIFFERENTIAL METHOD BLD: ABNORMAL
EOSINOPHIL # BLD AUTO: 0 10E9/L (ref 0–0.7)
EOSINOPHIL NFR BLD AUTO: 0.1 %
ERYTHROCYTE [DISTWIDTH] IN BLOOD BY AUTOMATED COUNT: 12.3 % (ref 10–15)
HCT VFR BLD AUTO: 36.2 % (ref 35–47)
HGB BLD-MCNC: 11.8 G/DL (ref 11.7–15.7)
IMM GRANULOCYTES # BLD: 0.1 10E9/L (ref 0–0.4)
IMM GRANULOCYTES NFR BLD: 0.5 %
LYMPHOCYTES # BLD AUTO: 1.2 10E9/L (ref 0.8–5.3)
LYMPHOCYTES NFR BLD AUTO: 7.2 %
MCH RBC QN AUTO: 29.6 PG (ref 26.5–33)
MCHC RBC AUTO-ENTMCNC: 32.6 G/DL (ref 31.5–36.5)
MCV RBC AUTO: 91 FL (ref 78–100)
MONOCYTES # BLD AUTO: 0.8 10E9/L (ref 0–1.3)
MONOCYTES NFR BLD AUTO: 5.1 %
NEUTROPHILS # BLD AUTO: 14.2 10E9/L (ref 1.6–8.3)
NEUTROPHILS NFR BLD AUTO: 86.8 %
NRBC # BLD AUTO: 0 10*3/UL
NRBC BLD AUTO-RTO: 0 /100
PLATELET # BLD AUTO: 229 10E9/L (ref 150–450)
RBC # BLD AUTO: 3.99 10E12/L (ref 3.8–5.2)
SPECIMEN EXP DATE BLD: NORMAL
WBC # BLD AUTO: 16.4 10E9/L (ref 4–11)

## 2019-06-23 PROCEDURE — 86850 RBC ANTIBODY SCREEN: CPT | Performed by: OBSTETRICS & GYNECOLOGY

## 2019-06-23 PROCEDURE — 25000128 H RX IP 250 OP 636: Performed by: NURSE ANESTHETIST, CERTIFIED REGISTERED

## 2019-06-23 PROCEDURE — 86900 BLOOD TYPING SEROLOGIC ABO: CPT | Performed by: OBSTETRICS & GYNECOLOGY

## 2019-06-23 PROCEDURE — 71000012 ZZH RECOVERY PHASE 1 LEVEL 1 FIRST HR: Performed by: OBSTETRICS & GYNECOLOGY

## 2019-06-23 PROCEDURE — 25800030 ZZH RX IP 258 OP 636: Performed by: NURSE ANESTHETIST, CERTIFIED REGISTERED

## 2019-06-23 PROCEDURE — 85025 COMPLETE CBC W/AUTO DIFF WBC: CPT | Performed by: OBSTETRICS & GYNECOLOGY

## 2019-06-23 PROCEDURE — 25000125 ZZHC RX 250: Performed by: OBSTETRICS & GYNECOLOGY

## 2019-06-23 PROCEDURE — 25000128 H RX IP 250 OP 636: Performed by: OBSTETRICS & GYNECOLOGY

## 2019-06-23 PROCEDURE — 59514 CESAREAN DELIVERY ONLY: CPT | Mod: AS | Performed by: NURSE PRACTITIONER

## 2019-06-23 PROCEDURE — 86780 TREPONEMA PALLIDUM: CPT | Performed by: OBSTETRICS & GYNECOLOGY

## 2019-06-23 PROCEDURE — 3E0K7GC INTRODUCTION OF OTHER THERAPEUTIC SUBSTANCE INTO GENITOURINARY TRACT, VIA NATURAL OR ARTIFICIAL OPENING: ICD-10-PCS | Performed by: OBSTETRICS & GYNECOLOGY

## 2019-06-23 PROCEDURE — 3E0R3BZ INTRODUCTION OF ANESTHETIC AGENT INTO SPINAL CANAL, PERCUTANEOUS APPROACH: ICD-10-PCS | Performed by: OBSTETRICS & GYNECOLOGY

## 2019-06-23 PROCEDURE — 37000009 ZZH ANESTHESIA TECHNICAL FEE, EACH ADDTL 15 MIN: Performed by: OBSTETRICS & GYNECOLOGY

## 2019-06-23 PROCEDURE — 25800030 ZZH RX IP 258 OP 636: Performed by: OBSTETRICS & GYNECOLOGY

## 2019-06-23 PROCEDURE — 00HU33Z INSERTION OF INFUSION DEVICE INTO SPINAL CANAL, PERCUTANEOUS APPROACH: ICD-10-PCS | Performed by: OBSTETRICS & GYNECOLOGY

## 2019-06-23 PROCEDURE — 12000000 ZZH R&B MED SURG/OB

## 2019-06-23 PROCEDURE — 36415 COLL VENOUS BLD VENIPUNCTURE: CPT | Performed by: OBSTETRICS & GYNECOLOGY

## 2019-06-23 PROCEDURE — C1765 ADHESION BARRIER: HCPCS | Performed by: OBSTETRICS & GYNECOLOGY

## 2019-06-23 PROCEDURE — 25000125 ZZHC RX 250: Performed by: NURSE ANESTHETIST, CERTIFIED REGISTERED

## 2019-06-23 PROCEDURE — 37000011 ZZH ANESTHESIA WARD SERVICE: Performed by: NURSE ANESTHETIST, CERTIFIED REGISTERED

## 2019-06-23 PROCEDURE — 27110028 ZZH OR GENERAL SUPPLY NON-STERILE: Performed by: OBSTETRICS & GYNECOLOGY

## 2019-06-23 PROCEDURE — 25000132 ZZH RX MED GY IP 250 OP 250 PS 637: Performed by: NURSE ANESTHETIST, CERTIFIED REGISTERED

## 2019-06-23 PROCEDURE — 36000058 ZZH SURGERY LEVEL 3 EA 15 ADDTL MIN: Performed by: OBSTETRICS & GYNECOLOGY

## 2019-06-23 PROCEDURE — 25000132 ZZH RX MED GY IP 250 OP 250 PS 637: Performed by: OBSTETRICS & GYNECOLOGY

## 2019-06-23 PROCEDURE — 86901 BLOOD TYPING SEROLOGIC RH(D): CPT | Performed by: OBSTETRICS & GYNECOLOGY

## 2019-06-23 PROCEDURE — 37000008 ZZH ANESTHESIA TECHNICAL FEE, 1ST 30 MIN: Performed by: OBSTETRICS & GYNECOLOGY

## 2019-06-23 PROCEDURE — 27210794 ZZH OR GENERAL SUPPLY STERILE: Performed by: OBSTETRICS & GYNECOLOGY

## 2019-06-23 PROCEDURE — 27110038 ZZH RX 271: Performed by: NURSE ANESTHETIST, CERTIFIED REGISTERED

## 2019-06-23 PROCEDURE — 71000013 ZZH RECOVERY PHASE 1 LEVEL 1 EA ADDTL HR: Performed by: OBSTETRICS & GYNECOLOGY

## 2019-06-23 PROCEDURE — 36000056 ZZH SURGERY LEVEL 3 1ST 30 MIN: Performed by: OBSTETRICS & GYNECOLOGY

## 2019-06-23 PROCEDURE — 40000671 ZZH STATISTIC ANESTHESIA CASE

## 2019-06-23 PROCEDURE — 59510 CESAREAN DELIVERY: CPT | Performed by: OBSTETRICS & GYNECOLOGY

## 2019-06-23 RX ORDER — HYDROCORTISONE 2.5 %
CREAM (GRAM) TOPICAL 3 TIMES DAILY PRN
Status: DISCONTINUED | OUTPATIENT
Start: 2019-06-23 | End: 2019-06-26 | Stop reason: HOSPADM

## 2019-06-23 RX ORDER — MISOPROSTOL 200 UG/1
TABLET ORAL PRN
Status: DISCONTINUED | OUTPATIENT
Start: 2019-06-23 | End: 2019-06-23

## 2019-06-23 RX ORDER — ONDANSETRON 2 MG/ML
4 INJECTION INTRAMUSCULAR; INTRAVENOUS EVERY 4 HOURS PRN
Status: DISCONTINUED | OUTPATIENT
Start: 2019-06-23 | End: 2019-06-23

## 2019-06-23 RX ORDER — ONDANSETRON 2 MG/ML
4 INJECTION INTRAMUSCULAR; INTRAVENOUS EVERY 30 MIN PRN
Status: DISCONTINUED | OUTPATIENT
Start: 2019-06-23 | End: 2019-06-23

## 2019-06-23 RX ORDER — HYDROMORPHONE HYDROCHLORIDE 1 MG/ML
.3-.5 INJECTION, SOLUTION INTRAMUSCULAR; INTRAVENOUS; SUBCUTANEOUS EVERY 30 MIN PRN
Status: DISCONTINUED | OUTPATIENT
Start: 2019-06-23 | End: 2019-06-26 | Stop reason: HOSPADM

## 2019-06-23 RX ORDER — ALBUTEROL SULFATE 0.83 MG/ML
2.5 SOLUTION RESPIRATORY (INHALATION) EVERY 4 HOURS PRN
Status: DISCONTINUED | OUTPATIENT
Start: 2019-06-23 | End: 2019-06-25

## 2019-06-23 RX ORDER — METOPROLOL TARTRATE 1 MG/ML
1-2 INJECTION, SOLUTION INTRAVENOUS EVERY 5 MIN PRN
Status: DISCONTINUED | OUTPATIENT
Start: 2019-06-23 | End: 2019-06-23

## 2019-06-23 RX ORDER — DIPHENHYDRAMINE HYDROCHLORIDE 50 MG/ML
25 INJECTION INTRAMUSCULAR; INTRAVENOUS EVERY 6 HOURS PRN
Status: DISCONTINUED | OUTPATIENT
Start: 2019-06-23 | End: 2019-06-25

## 2019-06-23 RX ORDER — KETOROLAC TROMETHAMINE 30 MG/ML
30 INJECTION, SOLUTION INTRAMUSCULAR; INTRAVENOUS EVERY 6 HOURS
Status: DISPENSED | OUTPATIENT
Start: 2019-06-23 | End: 2019-06-24

## 2019-06-23 RX ORDER — DIPHENHYDRAMINE HCL 25 MG
25 CAPSULE ORAL EVERY 6 HOURS PRN
Status: DISCONTINUED | OUTPATIENT
Start: 2019-06-23 | End: 2019-06-25

## 2019-06-23 RX ORDER — LIDOCAINE HYDROCHLORIDE 10 MG/ML
INJECTION, SOLUTION EPIDURAL; INFILTRATION; INTRACAUDAL; PERINEURAL
Status: COMPLETED
Start: 2019-06-23 | End: 2019-06-23

## 2019-06-23 RX ORDER — ONDANSETRON 2 MG/ML
4 INJECTION INTRAMUSCULAR; INTRAVENOUS EVERY 6 HOURS PRN
Status: DISCONTINUED | OUTPATIENT
Start: 2019-06-23 | End: 2019-06-26 | Stop reason: HOSPADM

## 2019-06-23 RX ORDER — AMOXICILLIN 250 MG
1 CAPSULE ORAL 2 TIMES DAILY PRN
Status: DISCONTINUED | OUTPATIENT
Start: 2019-06-23 | End: 2019-06-26 | Stop reason: HOSPADM

## 2019-06-23 RX ORDER — LIDOCAINE 40 MG/G
CREAM TOPICAL
Status: DISCONTINUED | OUTPATIENT
Start: 2019-06-23 | End: 2019-06-26 | Stop reason: HOSPADM

## 2019-06-23 RX ORDER — DEXAMETHASONE SODIUM PHOSPHATE 4 MG/ML
4 INJECTION, SOLUTION INTRA-ARTICULAR; INTRALESIONAL; INTRAMUSCULAR; INTRAVENOUS; SOFT TISSUE
Status: DISCONTINUED | OUTPATIENT
Start: 2019-06-23 | End: 2019-06-23

## 2019-06-23 RX ORDER — FENTANYL CITRATE 50 UG/ML
INJECTION, SOLUTION INTRAMUSCULAR; INTRAVENOUS PRN
Status: DISCONTINUED | OUTPATIENT
Start: 2019-06-23 | End: 2019-06-23

## 2019-06-23 RX ORDER — ACETAMINOPHEN 325 MG/1
650 TABLET ORAL EVERY 4 HOURS PRN
Status: DISCONTINUED | OUTPATIENT
Start: 2019-06-23 | End: 2019-06-23

## 2019-06-23 RX ORDER — OXYTOCIN 10 [USP'U]/ML
10 INJECTION, SOLUTION INTRAMUSCULAR; INTRAVENOUS
Status: DISCONTINUED | OUTPATIENT
Start: 2019-06-23 | End: 2019-06-23

## 2019-06-23 RX ORDER — NALOXONE HYDROCHLORIDE 0.4 MG/ML
.1-.4 INJECTION, SOLUTION INTRAMUSCULAR; INTRAVENOUS; SUBCUTANEOUS
Status: DISCONTINUED | OUTPATIENT
Start: 2019-06-23 | End: 2019-06-25

## 2019-06-23 RX ORDER — OXYTOCIN/0.9 % SODIUM CHLORIDE 30/500 ML
100 PLASTIC BAG, INJECTION (ML) INTRAVENOUS CONTINUOUS
Status: DISCONTINUED | OUTPATIENT
Start: 2019-06-23 | End: 2019-06-26 | Stop reason: HOSPADM

## 2019-06-23 RX ORDER — CALCIUM CARBONATE 500 MG/1
2 TABLET, CHEWABLE ORAL
COMMUNITY

## 2019-06-23 RX ORDER — LIDOCAINE HYDROCHLORIDE AND EPINEPHRINE 15; 5 MG/ML; UG/ML
INJECTION, SOLUTION EPIDURAL PRN
Status: DISCONTINUED | OUTPATIENT
Start: 2019-06-23 | End: 2019-06-23

## 2019-06-23 RX ORDER — CARBOPROST TROMETHAMINE 250 UG/ML
INJECTION, SOLUTION INTRAMUSCULAR PRN
Status: DISCONTINUED | OUTPATIENT
Start: 2019-06-23 | End: 2019-06-23

## 2019-06-23 RX ORDER — BISACODYL 10 MG
10 SUPPOSITORY, RECTAL RECTAL DAILY PRN
Status: DISCONTINUED | OUTPATIENT
Start: 2019-06-25 | End: 2019-06-26 | Stop reason: HOSPADM

## 2019-06-23 RX ORDER — FENTANYL CITRATE 50 UG/ML
INJECTION, SOLUTION INTRAMUSCULAR; INTRAVENOUS
Status: COMPLETED
Start: 2019-06-23 | End: 2019-06-23

## 2019-06-23 RX ORDER — ACETAMINOPHEN 325 MG/1
650 TABLET ORAL EVERY 4 HOURS PRN
Status: DISCONTINUED | OUTPATIENT
Start: 2019-06-26 | End: 2019-06-26 | Stop reason: HOSPADM

## 2019-06-23 RX ORDER — BUPIVACAINE HYDROCHLORIDE 2.5 MG/ML
INJECTION, SOLUTION EPIDURAL; INFILTRATION; INTRACAUDAL
Status: COMPLETED
Start: 2019-06-23 | End: 2019-06-23

## 2019-06-23 RX ORDER — ONDANSETRON 2 MG/ML
INJECTION INTRAMUSCULAR; INTRAVENOUS PRN
Status: DISCONTINUED | OUTPATIENT
Start: 2019-06-23 | End: 2019-06-23

## 2019-06-23 RX ORDER — SCOLOPAMINE TRANSDERMAL SYSTEM 1 MG/1
1 PATCH, EXTENDED RELEASE TRANSDERMAL ONCE
Status: DISCONTINUED | OUTPATIENT
Start: 2019-06-23 | End: 2019-06-23

## 2019-06-23 RX ORDER — ONDANSETRON 2 MG/ML
4 INJECTION INTRAMUSCULAR; INTRAVENOUS EVERY 6 HOURS PRN
Status: DISCONTINUED | OUTPATIENT
Start: 2019-06-23 | End: 2019-06-23

## 2019-06-23 RX ORDER — CEFAZOLIN SODIUM 1 G/50ML
1 INJECTION, SOLUTION INTRAVENOUS SEE ADMIN INSTRUCTIONS
Status: DISCONTINUED | OUTPATIENT
Start: 2019-06-23 | End: 2019-06-23

## 2019-06-23 RX ORDER — LIDOCAINE HYDROCHLORIDE 10 MG/ML
INJECTION, SOLUTION INFILTRATION; PERINEURAL PRN
Status: DISCONTINUED | OUTPATIENT
Start: 2019-06-23 | End: 2019-06-23

## 2019-06-23 RX ORDER — MAGNESIUM HYDROXIDE 1200 MG/15ML
LIQUID ORAL PRN
Status: DISCONTINUED | OUTPATIENT
Start: 2019-06-23 | End: 2019-06-26 | Stop reason: HOSPADM

## 2019-06-23 RX ORDER — MORPHINE SULFATE 4 MG/ML
INJECTION, SOLUTION INTRAMUSCULAR; INTRAVENOUS PRN
Status: DISCONTINUED | OUTPATIENT
Start: 2019-06-23 | End: 2019-06-23

## 2019-06-23 RX ORDER — NALOXONE HYDROCHLORIDE 0.4 MG/ML
.1-.4 INJECTION, SOLUTION INTRAMUSCULAR; INTRAVENOUS; SUBCUTANEOUS
Status: DISCONTINUED | OUTPATIENT
Start: 2019-06-23 | End: 2019-06-23

## 2019-06-23 RX ORDER — LIDOCAINE HCL/EPINEPHRINE/PF 2%-1:200K
VIAL (ML) INJECTION PRN
Status: DISCONTINUED | OUTPATIENT
Start: 2019-06-23 | End: 2019-06-23

## 2019-06-23 RX ORDER — IBUPROFEN 800 MG/1
800 TABLET, FILM COATED ORAL EVERY 6 HOURS PRN
Status: DISCONTINUED | OUTPATIENT
Start: 2019-06-23 | End: 2019-06-26 | Stop reason: HOSPADM

## 2019-06-23 RX ORDER — CALCIUM CARBONATE 500 MG/1
1000 TABLET, CHEWABLE ORAL
Status: DISCONTINUED | OUTPATIENT
Start: 2019-06-23 | End: 2019-06-26 | Stop reason: HOSPADM

## 2019-06-23 RX ORDER — CITRIC ACID/SODIUM CITRATE 334-500MG
30 SOLUTION, ORAL ORAL
Status: COMPLETED | OUTPATIENT
Start: 2019-06-23 | End: 2019-06-23

## 2019-06-23 RX ORDER — METHYLERGONOVINE MALEATE 0.2 MG/ML
200 INJECTION INTRAVENOUS
Status: DISCONTINUED | OUTPATIENT
Start: 2019-06-23 | End: 2019-06-23

## 2019-06-23 RX ORDER — SIMETHICONE 80 MG
80 TABLET,CHEWABLE ORAL 4 TIMES DAILY PRN
Status: DISCONTINUED | OUTPATIENT
Start: 2019-06-23 | End: 2019-06-26 | Stop reason: HOSPADM

## 2019-06-23 RX ORDER — NALBUPHINE HYDROCHLORIDE 10 MG/ML
2.5-5 INJECTION, SOLUTION INTRAMUSCULAR; INTRAVENOUS; SUBCUTANEOUS EVERY 6 HOURS PRN
Status: DISCONTINUED | OUTPATIENT
Start: 2019-06-23 | End: 2019-06-25

## 2019-06-23 RX ORDER — PRENATAL VIT/IRON FUM/FOLIC AC 27MG-0.8MG
1 TABLET ORAL DAILY
Status: DISCONTINUED | OUTPATIENT
Start: 2019-06-24 | End: 2019-06-26 | Stop reason: HOSPADM

## 2019-06-23 RX ORDER — HYDROMORPHONE HYDROCHLORIDE 1 MG/ML
.3-.5 INJECTION, SOLUTION INTRAMUSCULAR; INTRAVENOUS; SUBCUTANEOUS EVERY 5 MIN PRN
Status: DISCONTINUED | OUTPATIENT
Start: 2019-06-23 | End: 2019-06-25

## 2019-06-23 RX ORDER — OXYTOCIN/0.9 % SODIUM CHLORIDE 30/500 ML
340 PLASTIC BAG, INJECTION (ML) INTRAVENOUS CONTINUOUS PRN
Status: DISCONTINUED | OUTPATIENT
Start: 2019-06-23 | End: 2019-06-26 | Stop reason: HOSPADM

## 2019-06-23 RX ORDER — OXYTOCIN/0.9 % SODIUM CHLORIDE 30/500 ML
100-340 PLASTIC BAG, INJECTION (ML) INTRAVENOUS CONTINUOUS PRN
Status: DISCONTINUED | OUTPATIENT
Start: 2019-06-23 | End: 2019-06-23

## 2019-06-23 RX ORDER — CEFAZOLIN SODIUM 2 G/100ML
2 INJECTION, SOLUTION INTRAVENOUS
Status: COMPLETED | OUTPATIENT
Start: 2019-06-23 | End: 2019-06-23

## 2019-06-23 RX ORDER — ONDANSETRON 4 MG/1
4 TABLET, ORALLY DISINTEGRATING ORAL EVERY 6 HOURS PRN
Status: DISCONTINUED | OUTPATIENT
Start: 2019-06-23 | End: 2019-06-23

## 2019-06-23 RX ORDER — SODIUM CHLORIDE, SODIUM LACTATE, POTASSIUM CHLORIDE, CALCIUM CHLORIDE 600; 310; 30; 20 MG/100ML; MG/100ML; MG/100ML; MG/100ML
INJECTION, SOLUTION INTRAVENOUS CONTINUOUS
Status: DISCONTINUED | OUTPATIENT
Start: 2019-06-23 | End: 2019-06-23

## 2019-06-23 RX ORDER — CARBOPROST TROMETHAMINE 250 UG/ML
250 INJECTION, SOLUTION INTRAMUSCULAR
Status: DISCONTINUED | OUTPATIENT
Start: 2019-06-23 | End: 2019-06-23

## 2019-06-23 RX ORDER — KETOROLAC TROMETHAMINE 30 MG/ML
30 INJECTION, SOLUTION INTRAMUSCULAR; INTRAVENOUS
Status: COMPLETED | OUTPATIENT
Start: 2019-06-23 | End: 2019-06-23

## 2019-06-23 RX ORDER — OXYCODONE HYDROCHLORIDE 5 MG/1
5-10 TABLET ORAL
Status: DISCONTINUED | OUTPATIENT
Start: 2019-06-23 | End: 2019-06-26 | Stop reason: HOSPADM

## 2019-06-23 RX ORDER — IBUPROFEN 800 MG/1
800 TABLET, FILM COATED ORAL
Status: DISCONTINUED | OUTPATIENT
Start: 2019-06-23 | End: 2019-06-23

## 2019-06-23 RX ORDER — AMOXICILLIN 250 MG
2 CAPSULE ORAL 2 TIMES DAILY PRN
Status: DISCONTINUED | OUTPATIENT
Start: 2019-06-23 | End: 2019-06-26 | Stop reason: HOSPADM

## 2019-06-23 RX ORDER — OXYCODONE AND ACETAMINOPHEN 5; 325 MG/1; MG/1
1 TABLET ORAL
Status: DISCONTINUED | OUTPATIENT
Start: 2019-06-23 | End: 2019-06-23

## 2019-06-23 RX ORDER — DEXTROSE, SODIUM CHLORIDE, SODIUM LACTATE, POTASSIUM CHLORIDE, AND CALCIUM CHLORIDE 5; .6; .31; .03; .02 G/100ML; G/100ML; G/100ML; G/100ML; G/100ML
INJECTION, SOLUTION INTRAVENOUS CONTINUOUS
Status: DISCONTINUED | OUTPATIENT
Start: 2019-06-23 | End: 2019-06-26 | Stop reason: HOSPADM

## 2019-06-23 RX ORDER — BUPIVACAINE HYDROCHLORIDE 2.5 MG/ML
INJECTION, SOLUTION EPIDURAL; INFILTRATION; INTRACAUDAL PRN
Status: DISCONTINUED | OUTPATIENT
Start: 2019-06-23 | End: 2019-06-23

## 2019-06-23 RX ORDER — OXYTOCIN 10 [USP'U]/ML
10 INJECTION, SOLUTION INTRAMUSCULAR; INTRAVENOUS
Status: DISCONTINUED | OUTPATIENT
Start: 2019-06-23 | End: 2019-06-26 | Stop reason: HOSPADM

## 2019-06-23 RX ORDER — METHYLERGONOVINE MALEATE 0.2 MG/ML
INJECTION INTRAVENOUS PRN
Status: DISCONTINUED | OUTPATIENT
Start: 2019-06-23 | End: 2019-06-23

## 2019-06-23 RX ORDER — LIDOCAINE 40 MG/G
CREAM TOPICAL
Status: DISCONTINUED | OUTPATIENT
Start: 2019-06-23 | End: 2019-06-25

## 2019-06-23 RX ORDER — ONDANSETRON 4 MG/1
4 TABLET, ORALLY DISINTEGRATING ORAL EVERY 30 MIN PRN
Status: DISCONTINUED | OUTPATIENT
Start: 2019-06-23 | End: 2019-06-23

## 2019-06-23 RX ORDER — EPHEDRINE SULFATE 50 MG/ML
5 INJECTION, SOLUTION INTRAMUSCULAR; INTRAVENOUS; SUBCUTANEOUS
Status: DISCONTINUED | OUTPATIENT
Start: 2019-06-23 | End: 2019-06-23

## 2019-06-23 RX ORDER — LIDOCAINE 40 MG/G
CREAM TOPICAL
Status: DISCONTINUED | OUTPATIENT
Start: 2019-06-23 | End: 2019-06-23

## 2019-06-23 RX ORDER — ACETAMINOPHEN 325 MG/1
975 TABLET ORAL EVERY 8 HOURS
Status: DISCONTINUED | OUTPATIENT
Start: 2019-06-23 | End: 2019-06-26 | Stop reason: HOSPADM

## 2019-06-23 RX ORDER — OXYTOCIN/0.9 % SODIUM CHLORIDE 30/500 ML
PLASTIC BAG, INJECTION (ML) INTRAVENOUS CONTINUOUS PRN
Status: DISCONTINUED | OUTPATIENT
Start: 2019-06-23 | End: 2019-06-23

## 2019-06-23 RX ORDER — FENTANYL CITRATE 50 UG/ML
25-50 INJECTION, SOLUTION INTRAMUSCULAR; INTRAVENOUS
Status: DISCONTINUED | OUTPATIENT
Start: 2019-06-23 | End: 2019-06-23

## 2019-06-23 RX ORDER — LANOLIN 100 %
OINTMENT (GRAM) TOPICAL
Status: DISCONTINUED | OUTPATIENT
Start: 2019-06-23 | End: 2019-06-26 | Stop reason: HOSPADM

## 2019-06-23 RX ADMIN — LIDOCAINE HYDROCHLORIDE,EPINEPHRINE BITARTRATE 5 ML: 20; .005 INJECTION, SOLUTION EPIDURAL; INFILTRATION; INTRACAUDAL; PERINEURAL at 19:09

## 2019-06-23 RX ADMIN — MISOPROSTOL 800 MCG: 200 TABLET ORAL at 20:04

## 2019-06-23 RX ADMIN — SODIUM CHLORIDE, POTASSIUM CHLORIDE, SODIUM LACTATE AND CALCIUM CHLORIDE: 600; 310; 30; 20 INJECTION, SOLUTION INTRAVENOUS at 18:52

## 2019-06-23 RX ADMIN — SODIUM CHLORIDE, POTASSIUM CHLORIDE, SODIUM LACTATE AND CALCIUM CHLORIDE: 600; 310; 30; 20 INJECTION, SOLUTION INTRAVENOUS at 06:07

## 2019-06-23 RX ADMIN — CEFAZOLIN SODIUM 2 G: 2 INJECTION, SOLUTION INTRAVENOUS at 18:32

## 2019-06-23 RX ADMIN — OXYTOCIN-SODIUM CHLORIDE 0.9% IV SOLN 30 UNIT/500ML 340 ML/HR: 30-0.9/5 SOLUTION at 19:14

## 2019-06-23 RX ADMIN — AZITHROMYCIN MONOHYDRATE 500 MG: 500 INJECTION, POWDER, LYOPHILIZED, FOR SOLUTION INTRAVENOUS at 18:33

## 2019-06-23 RX ADMIN — ONDANSETRON 4 MG: 2 INJECTION INTRAMUSCULAR; INTRAVENOUS at 19:40

## 2019-06-23 RX ADMIN — METHYLERGONOVINE MALEATE 200 MCG: 0.2 INJECTION INTRAMUSCULAR; INTRAVENOUS at 19:17

## 2019-06-23 RX ADMIN — Medication 10 ML/HR: at 10:13

## 2019-06-23 RX ADMIN — CARBOPROST TROMETHAMINE 250 MCG: 250 INJECTION, SOLUTION INTRAMUSCULAR at 20:06

## 2019-06-23 RX ADMIN — SODIUM CITRATE AND CITRIC ACID MONOHYDRATE 30 ML: 500; 334 SOLUTION ORAL at 18:31

## 2019-06-23 RX ADMIN — PHENYLEPHRINE HYDROCHLORIDE 100 MCG: 10 INJECTION INTRAVENOUS at 19:32

## 2019-06-23 RX ADMIN — MORPHINE SULFATE 4 MG: 4 INJECTION, SOLUTION INTRAMUSCULAR; INTRAVENOUS at 19:15

## 2019-06-23 RX ADMIN — BUPIVACAINE HYDROCHLORIDE 10 ML: 2.5 INJECTION, SOLUTION EPIDURAL; INFILTRATION; INTRACAUDAL at 10:05

## 2019-06-23 RX ADMIN — DIPHENHYDRAMINE HYDROCHLORIDE 25 MG: 50 INJECTION, SOLUTION INTRAMUSCULAR; INTRAVENOUS at 17:29

## 2019-06-23 RX ADMIN — FENTANYL CITRATE 100 MCG: 50 INJECTION, SOLUTION INTRAMUSCULAR; INTRAVENOUS at 10:05

## 2019-06-23 RX ADMIN — LIDOCAINE HYDROCHLORIDE,EPINEPHRINE BITARTRATE 4 ML: 20; .005 INJECTION, SOLUTION EPIDURAL; INFILTRATION; INTRACAUDAL; PERINEURAL at 19:03

## 2019-06-23 RX ADMIN — Medication: at 10:12

## 2019-06-23 RX ADMIN — LIDOCAINE HYDROCHLORIDE,EPINEPHRINE BITARTRATE 6 ML: 20; .005 INJECTION, SOLUTION EPIDURAL; INFILTRATION; INTRACAUDAL; PERINEURAL at 19:06

## 2019-06-23 RX ADMIN — LIDOCAINE HYDROCHLORIDE AND EPINEPHRINE 75 MG: 15; 5 INJECTION, SOLUTION EPIDURAL at 10:00

## 2019-06-23 RX ADMIN — Medication 5 MG: at 10:52

## 2019-06-23 RX ADMIN — KETOROLAC TROMETHAMINE 30 MG: 30 INJECTION, SOLUTION INTRAMUSCULAR at 21:44

## 2019-06-23 RX ADMIN — ONDANSETRON 4 MG: 2 INJECTION INTRAMUSCULAR; INTRAVENOUS at 10:44

## 2019-06-23 RX ADMIN — TRANEXAMIC ACID 1 G: 100 INJECTION, SOLUTION INTRAVENOUS at 20:12

## 2019-06-23 RX ADMIN — LIDOCAINE HYDROCHLORIDE 80 MG: 10 INJECTION, SOLUTION INFILTRATION; PERINEURAL at 09:48

## 2019-06-23 ASSESSMENT — ACTIVITIES OF DAILY LIVING (ADL)
AMBULATION: 0-->INDEPENDENT
TRANSFERRING: 0-->INDEPENDENT
TOILETING: 0-->INDEPENDENT
FALL_HISTORY_WITHIN_LAST_SIX_MONTHS: NO
RETIRED_COMMUNICATION: 0-->UNDERSTANDS/COMMUNICATES WITHOUT DIFFICULTY
DRESS: 0-->INDEPENDENT
SWALLOWING: 0-->SWALLOWS FOODS/LIQUIDS WITHOUT DIFFICULTY
COGNITION: 0 - NO COGNITION ISSUES REPORTED
RETIRED_EATING: 0-->INDEPENDENT
BATHING: 0-->INDEPENDENT

## 2019-06-23 NOTE — PLAN OF CARE
Guzman placed in bladder, urine returned. SVE  4/80/-2. Thick mec present. Prolonged decel x 1. after cervical check. Pt states no pain, no nausea, afebrile, VSS. Cat 2 tracing, adequate contraction pattern. Will continue to assess and monitor.

## 2019-06-23 NOTE — PLAN OF CARE
Patient's pain will be managed during labor using breathing and relaxation techniques. Patient will accept pain level of 6/10 before considering use of pain relieving medication. Fetal heart tones assessed and Category 1 tracing noted. Uterine activity assessed and normal uterine activity noted. Interventions included IV fluid flush and increased oral fluids. Fetal heart tones moderate variability.  . Dr. MYKEL Darby was updated and at bedside to see pt. Plan is to continue spontaneous labor, epidural for pain control, assess and monitor throughout progress  . Patient informed of and agrees with plan of care.

## 2019-06-23 NOTE — PLAN OF CARE
Spontaneous labor, no pitocin. Adequate contraction pattern, adequate cervical change. Moderate variability. At 1640 prolonged decel x 1. Pt is being repositioned, peanut ball side to side. Will continue to assess and monitor

## 2019-06-23 NOTE — ANESTHESIA PROCEDURE NOTES
Peripheral nerve/Neuraxial procedure note : epidural catheter  Pre-Procedure  Performed by  Brittany Arshad APRN CRNA   Location: OB    Procedure Times:6/23/2019 9:45 AM and 6/23/2019 10:10 AM  Pre-Anesthestic Checklist: patient identified, IV checked, risks and benefits discussed, informed consent, monitors and equipment checked, pre-op evaluation and at physician/surgeon's request    Timeout  Correct Patient: Yes   Correct Procedure: Yes   Correct Site: Yes   Correct Laterality: N/A   Correct Position: Yes   Site Marked: N/A   .   Procedure Documentation    Diagnosis:Pain.    Procedure:    Epidural catheter.  Insertion Site:L3-4  (midline approach) Injection technique: LORT saline   Local skin infiltrated with 8 mL of 1% lidocaine.       Patient Prep;mask, sterile gloves, patient draped.  .  Needle: Touhy needle Needle Gauge: 17.    Needle Length (Inches) 3.5  # of attempts: 1 and # of redirects:  .   Catheter: 19 G . .  Catheter threaded easily  4 cm epidural space.  11 cm at skin.   .    Assessment/Narrative  Paresthesias: No.  .  .  Aspiration negative for heme or CSF  . Test dose of 5 mL lidocaine 1.5% w/ 1:200,000 epinephrine at 10:00.  Test dose negative for signs of intravascular, subdural or intrathecal injection. Comments:  VAS pain score prior to epidural:4-5/10    VAS pain score after epidural:0/10    Pt. Tolerated well, FHR stable.

## 2019-06-23 NOTE — PLAN OF CARE
S:Patient presents due to  fluid leaking .  B:40w2d   Allergies: Patient has no known allergies.  A:A:minimal variability, no decels, Category II  normal uterine activity  mid position, dilated to 2, effaced 50-70% and soft    Dr. NIKI Martinez called and orders received.  Plan includes; admit for SROM with meconium . Reviewed with patient and she agrees with plan.   Bill of Rights given & questions discussed?: Yes  HC Your Rights handout : Informed and given    Oriented to room and call light.

## 2019-06-23 NOTE — H&P
L&D History and Physical   2019  Darlene Johnson  [id number]      HPI: Darlene Johnson is a 29 year old  at 40w2d based on embryo transfer date, here vaginal leakage of fluid that occurred at 0345 today.    She states that she is feeling well today.  She denies fever, HA, blurred vision, Nausea, vomiting, CP, SOB, abdominal pain, constipation, diarrhea, vaginal bleeding, abnormal vaginal discharge, and acute swelling.  +FM.      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   Current Outpatient Medications on File Prior to Encounter:  aspirin 81 MG tablet Take 81 mg by mouth daily   FOLIC ACID PO Take 400 mcg by mouth daily   Prenatal Vit-Fe Fumarate-FA (PRENATAL MULTIVITAMIN PLUS IRON) 27-0.8 MG TABS per tablet Take 1 tablet by mouth daily   VITAMIN D, CHOLECALCIFEROL, PO Take 2,000 Units by mouth daily       Allergies:  No Known Allergies    FamilyHX:  Family History   Problem Relation Age of Onset     Heart Surgery Maternal Grandmother      Melanoma Maternal Grandfather      Coronary Artery Disease Paternal Grandfather         MI       SocialHX:   Social History     Socioeconomic History     Marital status:      Spouse name: Not on file     Number of children: Not on file     Years of education: Not on file      Highest education level: Not on file   Occupational History     Not on file   Social Needs     Financial resource strain: Not on file     Food insecurity:     Worry: Not on file     Inability: Not on file     Transportation needs:     Medical: Not on file     Non-medical: Not on file   Tobacco Use     Smoking status: Never Smoker     Smokeless tobacco: Never Used   Substance and Sexual Activity     Alcohol use: Yes     Comment: 1-2- drink weekly quit with pregnancy     Drug use: No     Sexual activity: Yes     Partners: Male   Lifestyle     Physical activity:     Days per week: Not on file     Minutes per session: Not on file     Stress: Not on file   Relationships     Social connections:     Talks on phone: Not on file     Gets together: Not on file     Attends Restorationism service: Not on file     Active member of club or organization: Not on file     Attends meetings of clubs or organizations: Not on file     Relationship status: Not on file     Intimate partner violence:     Fear of current or ex partner: Not on file     Emotionally abused: Not on file     Physically abused: Not on file     Forced sexual activity: Not on file   Other Topics Concern     Not on file   Social History Narrative     Not on file       ROS: 10-point ROS negative except as in HPI     Physical Exam:  Vitals:    06/23/19 0445 06/23/19 0530 06/23/19 0642   BP: 130/70  135/70   Resp: 16  18   Temp: 97.4  F (36.3  C) 97.3  F (36.3  C) 97.9  F (36.6  C)   TempSrc: Oral Oral Oral     GEN: resting comfortably in bed, in NAD   CVS: RRR, no murmur appreciated   PULMONARY: CTAB, no increased work of breathing, no cough/wheeze   ABDOMEN: soft, gravid, non-tender, non-distended  EXTREMITIES: trace edema, non tender to palpation  CVX: 2/80/-2  Presentation: cephalic by cervical  EFW: AGA    NST:  FHT: baseline 150, moderate variability, accelerations none, no decelerations  TOCO: q2-3 minutes      Ultrasounds:  Reviewed in cepic    Labs:   Results for  orders placed or performed during the hospital encounter of 19 (from the past 24 hour(s))   CBC with platelets differential   Result Value Ref Range    WBC 16.4 (H) 4.0 - 11.0 10e9/L    RBC Count 3.99 3.8 - 5.2 10e12/L    Hemoglobin 11.8 11.7 - 15.7 g/dL    Hematocrit 36.2 35.0 - 47.0 %    MCV 91 78 - 100 fl    MCH 29.6 26.5 - 33.0 pg    MCHC 32.6 31.5 - 36.5 g/dL    RDW 12.3 10.0 - 15.0 %    Platelet Count 229 150 - 450 10e9/L    Diff Method Automated Method     % Neutrophils 86.8 %    % Lymphocytes 7.2 %    % Monocytes 5.1 %    % Eosinophils 0.1 %    % Basophils 0.3 %    % Immature Granulocytes 0.5 %    Nucleated RBCs 0 0 /100    Absolute Neutrophil 14.2 (H) 1.6 - 8.3 10e9/L    Absolute Lymphocytes 1.2 0.8 - 5.3 10e9/L    Absolute Monocytes 0.8 0.0 - 1.3 10e9/L    Absolute Eosinophils 0.0 0.0 - 0.7 10e9/L    Absolute Basophils 0.1 0.0 - 0.2 10e9/L    Abs Immature Granulocytes 0.1 0 - 0.4 10e9/L    Absolute Nucleated RBC 0.0        Lab Results   Component Value Date    ABO A 2018    RH Pos 2018    AS Neg 2018    HEPBANG Nonreactive 2018    CHPCRT Negative 2018    GCPCRT Negative 2018    HGB 11.8 2019       GBS Status:   Lab Results   Component Value Date    GBS Negative 2019       Lab Results   Component Value Date    PAP NIL 2018       A/P: Darlene Johnson is a 29 year old female  at 40w2d by based on embryo transfer date US, here for SROM    Admit for: Spontaneous rupture of membranes, meconium stained. Monitor for signs and symptoms of chorioamnionitis.   Discussed with Darlene Johnson, the following; indications; the agents and methods of labor augmentation, including risks, benefits, and alternative approaches; and the possible need for  birth. EFW is AGA.   The Labor Induction:what you need to know information sheet was made available to her. Questions and concerns were addressed and patient agrees to above if necessary during the course of  her labor.  FHT: Category 1 tracing  GBS status: Negative; antibiotics not indicated  Pain management: Epidural per patient request; to be administered by anesthesia provider.     Concetta Darby MD  [facility]

## 2019-06-23 NOTE — PLAN OF CARE
Fetal heart rate dipped to 90 bpm for 80-90 seconds.  Resolved spontaneously.  Attempts x2 to place FSE were unsuccessful; fetal head at -2 station.  Patient remains on external monitors.

## 2019-06-23 NOTE — H&P (VIEW-ONLY)
Intrapartum Note    RN paged me re: prolonged (4 minutes) fetal heart rate deceleration with recovery to baseline but minimal to absent variability since that time. There has also been previous episodes of decelerations that were not as prolonged with adequate recovery to baseline with moderate variability.   Seen and evaluated patient.   Patient is currently comfortable. S/p epidural.   AF, VSS  Gravid abdomen and nontender.   Cervix: 4/75/-2, ruptured membranes with ongoing thick meconium stained fluid   FHT: 150s, minimal variability, accels present, no decels  A/P: IUP 40w2d in labor  -- discussed current situation with patient and in light of occasional decelerations with most recent prolonged deceleration with current category 2 fetal heart tracing, as well as previously noted meconium stained amniotic fluid, discussed these factors as ominous signs for possible future nonreassuring fetal events during labor, especially given that she is remote from vaginal delivery  -- options reviewed with patient including expectant management vs primary  section due to above reasons; patient elects for primary  section  -- risk reviewed with patient including but not limited to bleeding, need for blood transfusion, infection, injury to surrounding organs (ie bowel/intestines, bladder, ureters, major blood vessels and nerves). If any of these organs are injured, then we identify it and try to fix it. If we cant fix it ourselves, then we consult surgeons that specialize in those areas that are damaged and they fix it for us. Unintended injuries can go unnoticed at the time of surgery as well, and present with complications days to weeks later. Also discussed fetal injury and  hysterectomy for life threatening bleeding. Patient conveyed understanding risks and agrees to proceed. She signed the consent form.     Concetta Darby MD  [facility]

## 2019-06-23 NOTE — ANESTHESIA PREPROCEDURE EVALUATION
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 Pt has had prior anesthetic.     No history of anesthetic complications          ROS/MED HX    ENT/Pulmonary:  - neg pulmonary ROS     Neurologic:  - neg neurologic ROS     Cardiovascular:  - neg cardiovascular ROS       METS/Exercise Tolerance:  >4 METS   Hematologic:  - neg hematologic  ROS       Musculoskeletal:  - neg musculoskeletal ROS       GI/Hepatic:  - neg GI/hepatic ROS       Renal/Genitourinary:  - ROS Renal section negative       Endo:  - neg endo ROS       Psychiatric:  - neg psychiatric ROS       Infectious Disease:  - neg infectious disease ROS       Malignancy:      - no malignancy   Other:    (+) Possibly pregnant                         Physical Exam  Normal systems: cardiovascular, pulmonary and dental    Airway   Mallampati: I  TM distance: >3 FB  Neck ROM: full    Dental     Cardiovascular       Pulmonary     Other findings: Fetal intolerance to labor        Lab Results   Component Value Date    WBC 16.4 (H) 2019    HGB 11.8 2019    HCT 36.2 2019     2019       Preop Vitals  BP Readings from Last 3 Encounters:   19 123/68   19 121/82   19 123/80    Pulse Readings from Last 3 Encounters:   19 75   19 66   19 73      Resp Readings from Last 3 Encounters:   19 18   19 16   19 16    SpO2 Readings from Last 3 Encounters:   19 92%      Temp Readings from Last 1 Encounters:   19 36.8  C (98.2  F) (Oral)    Ht Readings from Last 1  "Encounters:   06/18/19 1.626 m (5' 4\")      Wt Readings from Last 1 Encounters:   06/18/19 88.2 kg (194 lb 6.4 oz)    Estimated body mass index is 33.37 kg/m  as calculated from the following:    Height as of 6/18/19: 1.626 m (5' 4\").    Weight as of 6/18/19: 88.2 kg (194 lb 6.4 oz).       Anesthesia Plan      History & Physical Review  History and physical reviewed and following examination; no interval change.    ASA Status:  2 emergent.    NPO Status:  > 8 hours    Plan for Epidural and General   PONV prophylaxis:  Ondansetron (or other 5HT-3) and Dexamethasone or Solumedrol       Postoperative Care  Postoperative pain management:  IV analgesics and Oral pain medications.      Consents  Anesthetic plan, risks, benefits and alternatives discussed with:  Patient and Spouse..                 SRIDHAR Vergara CRNA  "

## 2019-06-23 NOTE — PROGRESS NOTES
Intrapartum Note    RN paged me re: prolonged (4 minutes) fetal heart rate deceleration with recovery to baseline but minimal to absent variability since that time. There has also been previous episodes of decelerations that were not as prolonged with adequate recovery to baseline with moderate variability.   Seen and evaluated patient.   Patient is currently comfortable. S/p epidural.   AF, VSS  Gravid abdomen and nontender.   Cervix: 4/75/-2, ruptured membranes with ongoing thick meconium stained fluid   FHT: 150s, minimal variability, accels present, no decels  A/P: IUP 40w2d in labor  -- discussed current situation with patient and in light of occasional decelerations with most recent prolonged deceleration with current category 2 fetal heart tracing, as well as previously noted meconium stained amniotic fluid, discussed these factors as ominous signs for possible future nonreassuring fetal events during labor, especially given that she is remote from vaginal delivery  -- options reviewed with patient including expectant management vs primary  section due to above reasons; patient elects for primary  section  -- risk reviewed with patient including but not limited to bleeding, need for blood transfusion, infection, injury to surrounding organs (ie bowel/intestines, bladder, ureters, major blood vessels and nerves). If any of these organs are injured, then we identify it and try to fix it. If we cant fix it ourselves, then we consult surgeons that specialize in those areas that are damaged and they fix it for us. Unintended injuries can go unnoticed at the time of surgery as well, and present with complications days to weeks later. Also discussed fetal injury and  hysterectomy for life threatening bleeding. Patient conveyed understanding risks and agrees to proceed. She signed the consent form.     Concetta Darby MD  [facility]

## 2019-06-23 NOTE — PROGRESS NOTES
Patient called to the birthplace stating she thinks her water broke around 0345; clear fluid, continues to leak.  No bleeding, baby is active, occasional contractions. Instructed to come into the the birthplace for further evaluation

## 2019-06-23 NOTE — PLAN OF CARE
Pt reported feeling nauseated, clammy and looked pale. IV zofran and ephedrine IV given. Cold wash cloth applied to forehead. Decreased BP.  With interventions, BP WNL's, color pink and skin warm and dry. Pt states feeling better and is resting comfortably. Cat 1 tracing. Contractions 2-3 min apart, 60-90 sec. Will continue to assess and monitor

## 2019-06-23 NOTE — H&P
[facility] Labor and Delivery History and Physical    Darlene Johnson MRN# [id number]   Age: 29 year old YOB: 1990     Date of Admission:  2019    Primary care provider: No Ref-Primary, Physician           Chief Complaint:   Darlene Johnson is a 29 year old female who is 40w2d pregnant and being admitted for SROM this early AM with spontaneous contractions; the amniotic fluid has meconium staining; she is GBS negative. She states the contractions are getting stronger and she desires epidural for labor analgesia.          Pregnancy history:     OBSTETRIC HISTORY:    OB History    Para Term  AB Living   1 0 0 0 0 0   SAB TAB Ectopic Multiple Live Births   0 0 0 0 0      # Outcome Date GA Lbr Rito/2nd Weight Sex Delivery Anes PTL Lv   1 Current                EDC: Estimated Date of Delivery: 19    Prenatal Labs:   Lab Results   Component Value Date    ABO A 2018    RH Pos 2018    AS Neg 2018    HEPBANG Nonreactive 2018    CHPCRT Negative 2018    GCPCRT Negative 2018    HGB 11.9 2019       GBS Status:   Lab Results   Component Value Date    GBS Negative 2019       Active Problem List  Patient Active Problem List   Diagnosis     Prenatal care, first pregnancy     Supervision of normal first pregnancy       Medication Prior to Admission  Medications Prior to Admission   Medication Sig Dispense Refill Last Dose     aspirin 81 MG tablet Take 81 mg by mouth daily   Taking     FOLIC ACID PO Take 400 mcg by mouth daily   Taking     Prenatal Vit-Fe Fumarate-FA (PRENATAL MULTIVITAMIN PLUS IRON) 27-0.8 MG TABS per tablet Take 1 tablet by mouth daily   Taking     VITAMIN D, CHOLECALCIFEROL, PO Take 2,000 Units by mouth daily   Taking   .        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    Family history   reviewed and updated in [facility]            Social History:     Social History     Tobacco Use     Smoking status: Never Smoker     Smokeless tobacco: Never Used   Substance Use Topics     Alcohol use: Yes     Comment: 1-2- drink weekly quit with pregnancy            Review of Systems:   The Review of Systems is negative other than noted in the HPI          Physical Exam:     Vitals were reviewed  Patient Vitals for the past 8 hrs:   BP Temp Temp src Resp   19 0642 135/70 97.9  F (36.6  C) Oral 18   19 0530 -- 97.3  F (36.3  C) Oral --   19 0445 130/70 97.4  F (36.3  C) Oral 16     Constitutional:   awake, alert, cooperative, no apparent distress, and appears stated age     Lungs:   No increased work of breathing, good air exchange, clear to auscultation bilaterally, no crackles or wheezing     Cardiovascular:   Normal apical impulse, regular rate and rhythm, normal S1 and S2, no S3 or S4, and no murmur noted     Abdomen:   No scars, normal bowel sounds, soft, non-distended, non-tender, no masses palpated, no hepatosplenomegally; gravid      Cervix:   Membranes: SROM  meconium stained fluid   Dilation: 2   Effacement: 80%   Station:-2   Consistency: soft   Position: Anterior  Presentation:Cephalic  Fetal Heart Rate Tracing: Tier 2 (indeterminate) variability diminished at times  Tocometer: external monitor                       Assessment:   Darlene Johnson is a 40w2d pregnant female admitted with SROM.          Plan:   Admit - see IP orders  Pain medication epidural  Peds to be present for delivery  Anticipate     Chante Martinez MD

## 2019-06-24 LAB
HGB BLD-MCNC: 8.9 G/DL (ref 11.7–15.7)
T PALLIDUM AB SER QL: NONREACTIVE

## 2019-06-24 PROCEDURE — 85018 HEMOGLOBIN: CPT | Performed by: OBSTETRICS & GYNECOLOGY

## 2019-06-24 PROCEDURE — 25000128 H RX IP 250 OP 636: Performed by: OBSTETRICS & GYNECOLOGY

## 2019-06-24 PROCEDURE — 12000000 ZZH R&B MED SURG/OB

## 2019-06-24 PROCEDURE — 25000132 ZZH RX MED GY IP 250 OP 250 PS 637: Performed by: OBSTETRICS & GYNECOLOGY

## 2019-06-24 PROCEDURE — 36415 COLL VENOUS BLD VENIPUNCTURE: CPT | Performed by: OBSTETRICS & GYNECOLOGY

## 2019-06-24 RX ADMIN — ACETAMINOPHEN 975 MG: 325 TABLET, FILM COATED ORAL at 06:05

## 2019-06-24 RX ADMIN — ACETAMINOPHEN 975 MG: 325 TABLET, FILM COATED ORAL at 22:06

## 2019-06-24 RX ADMIN — ACETAMINOPHEN 975 MG: 325 TABLET, FILM COATED ORAL at 14:49

## 2019-06-24 RX ADMIN — KETOROLAC TROMETHAMINE 30 MG: 30 INJECTION, SOLUTION INTRAMUSCULAR at 16:49

## 2019-06-24 RX ADMIN — IBUPROFEN 800 MG: 800 TABLET ORAL at 23:56

## 2019-06-24 RX ADMIN — KETOROLAC TROMETHAMINE 30 MG: 30 INJECTION, SOLUTION INTRAMUSCULAR at 04:19

## 2019-06-24 RX ADMIN — KETOROLAC TROMETHAMINE 30 MG: 30 INJECTION, SOLUTION INTRAMUSCULAR at 10:21

## 2019-06-24 NOTE — ANESTHESIA POSTPROCEDURE EVALUATION
Patient: Darlene Johnson    Procedure(s):   SECTION    Diagnosis:Fetal Intolerance  Diagnosis Additional Information: No value filed.    Anesthesia Type:  Epidural, General    Note:  Anesthesia Post Evaluation    Patient location during evaluation: Bedside  Patient participation: Able to fully participate in evaluation  Level of consciousness: awake  Pain management: adequate  Airway patency: patent  Cardiovascular status: stable  Respiratory status: room air  Hydration status: stable  PONV: none     Anesthetic complications: None          Last vitals:  Vitals:    19 1640 19 1700 19 1810   BP:  133/74 (!) 157/102   Resp:      Temp:  37.2  C (99  F) 37.3  C (99.1  F)   SpO2: 92% 97% 98%         Electronically Signed By: SRIDHAR Vergara CRNA  2019  8:23 PM

## 2019-06-24 NOTE — BRIEF OP NOTE
[facility]  Brief Op Note    Patient Name:Darlene Johnson  MRN: [id number]  YOB: 1990  Surgery Date: 2019    Surgeon: Concetta Darby MD  Assistant: Tina Posadas NP who assisted with retraction, uterine fundal pressure application to facilitate delivery of infant from hysterotomy incision.     Pre-operative Diagnosis: 1) Intrauterine pregnancy at 40w2d 2) Category 2 fetal heart tracing, remote from delivery    Post-operative Diagnosis: Viable female infant, delivered  Procedure: Primary low transverse  section via Pfannenstiel skin incision with two layer uterine closure    Anesthesia: Epidural  FRA Score: 2    EBL: 629 mL plus 220 mL of lochia following procedure  IV fluids: 1300 mL crystalloids  Urine Output: 600 ml yellow urine at the end of the procedure    Complications: None  Specimen: None  Drains: Guzman catheter    Findings: Viable female infant delivered in cephalic presentation at 1913 on 2019. Nuchal cord tight x 1 that was reduced. Thick meconium stained amniotic fluid on hysterotomy. Weight 8 lbs 13 oz. Apgar 8 and 8 at 1 and 5 minutes, respectively. Normal appearing uterus, bilateral fallopian tubes and ovaries.     Technique: To follow with full operative note    Concetta Darby MD  [facility]

## 2019-06-24 NOTE — PROGRESS NOTES
PPD#1  Doing well. Pain well controlled on IV pain medication. Tolerating regular diet. Guzman catheter in place. Passing flatus. Has yet to ambulate. Lochia is scant. Breast feeding.     Vitals:    19 0115 19 0202 19 0600 19 0815   BP: 116/46 125/51 134/61 110/48   Pulse: 81 83  87   Resp:    16   Temp:   98.5  F (36.9  C) 98.3  F (36.8  C)   TempSrc:   Oral Oral   SpO2: 94% 95% 94% 97%     Urine output: 750 mL since 2300, ; 1350 mL in the last 24 hours    General Appearance: NAD  Abdomen: Soft, NT, ND. Fundus firm at U-2  Incision: Bandaged and appears dry  Extremities: NT, trace edema    Hemoglobin   Date Value Ref Range Status   2019 8.9 (L) 11.7 - 15.7 g/dL Final   2019 11.8 11.7 - 15.7 g/dL Final   ]    A/P: 29 year old  PPD#1 s/p PLTCS for fetal intolerance of labor. Hemodynamically stable.   -- routine post-op care  -- encouraged to ambulate  -- Guzman catheter in place; remove later in the evening assuming ongoing adequate urine output  -- anticipate discharge on POD#3 when meeting discharge goals    Concetta Darby MD  [facility]

## 2019-06-24 NOTE — L&D DELIVERY NOTE
"Delivery Summary      29 year old  40w2d presented with SROM that occurred at  0345, ; fluid was noted to be thick amniotic fluid.   GBS negative status  Cervix on admission was 2/80/-2 at 0500,   S/p epidural  Occasional spontaneous decelerations with recovery to normal baseline and moderate variability with exception of last spontaneous deceleration being more prolonged (4 minutes) and recovery to normal baseline but with absent to minimal variability.   Cervix checked following deceleration and was 4/75/-2.   Given Category 2 tracing remote from delivery, options were reviewed with patient which included expectant management vs primary  section.   Patient elected to proceed with  section.   Risks were reviewed with patient with conveyed understanding.   She signed the consent form.     Patient underwent an uncomplicated PLTCS  Viable female infant, delivered in cephalic presentation at .   Tight nuchal cord x 1 that was reduced during delivery.   Thick meconium stained amniotic fluid on hysterotomy.   Weight 8 lbs 13 oz.   Apgar 8 and 8 at 1 and 5 minutes, respectively.   Placenta delivered spontaneously, intact.   Standard IV pitocin (30units in 500 mL normal saline) and IM methergine administered due to uterine atony.   Normal appearing uterus, bilateral fallopian tubes and ovaries.    mL   \"Lakelyn\"    Following completion of  section and during externa uterine fundal check, there was 220 mL of blood expressed from the uterus. Additional uterotonics were administered and this included rectal misoprostol, IM Hemabate, and IV tranexamic acid.     Concetta Darby MD  [facility]            "

## 2019-06-24 NOTE — ANESTHESIA CARE TRANSFER NOTE
Patient: Darlene Johnson    Procedure(s):   SECTION    Diagnosis: Fetal Intolerance  Diagnosis Additional Information: No value filed.    Anesthesia Type:   Epidural, General     Note:  Airway :Room Air  Patient transferred to:PACU  Handoff Report: Identifed the Patient, Identified the Reponsible Provider, Reviewed the pertinent medical history, Discussed the surgical course, Reviewed Intra-OP anesthesia mangement and issues during anesthesia, Set expectations for post-procedure period and Allowed opportunity for questions and acknowledgement of understanding      Vitals: (Last set prior to Anesthesia Care Transfer)    CRNA VITALS  2019 1942 - 2019             Pulse:  105    SpO2:  99 %                Electronically Signed By: SRIDHAR Vergara CRNA  2019  8:21 PM

## 2019-06-24 NOTE — PROGRESS NOTES
Pt up at bedside and walked a few steps before sitting in chair.  Tolerated well and instructed to call when ready to get up.  Call light In reach

## 2019-06-24 NOTE — ANESTHESIA POSTPROCEDURE EVALUATION
Patient: Darlene Johnson    * No procedures listed *    Diagnosis:* No pre-op diagnosis entered *  Diagnosis Additional Information: No value filed.    Anesthesia Type:  No value filed.    Note:  Anesthesia Post Evaluation    Patient location during evaluation: Bedside  Patient participation: Able to fully participate in evaluation  Level of consciousness: awake and alert  Pain management: adequate  Airway patency: patent  Cardiovascular status: stable  Respiratory status: face mask  PONV: none       Comments: To OR for C/S        Last vitals:  Vitals:    06/23/19 1640 06/23/19 1700 06/23/19 1810   BP:  133/74 (!) 157/102   Resp:      Temp:  37.2  C (99  F) 37.3  C (99.1  F)   SpO2: 92% 97% 98%         Electronically Signed By: SRIDHAR Vergara CRNA  June 23, 2019  8:35 PM

## 2019-06-24 NOTE — PLAN OF CARE
S:Delivery  B:Spontaneous Labor,40w2d    Lab Results   Component Value Date    GBS Negative 2019    with antibiotic treatment not indicated 4 hours prior to delivery.  A: Patient delivered Primary C/S for  fetal intolerance at 1913 with Dr. MYKEL Darby in attendance and baby placed on mother's abdomen for immediate cord clamping. Baby received from surgeon. Baby to  warmer for assessment/resusitative efforts.. Placed skin to skin @ .. Apgars 8/8.  IV infusion of Oxytocin  infused. Placenta removal manual. MD does not want placenta sent to pathology.  See Flowsheet for VS and PP checks.  .  Labor care plan goals met, transition now to postpartum care.  R: Expect routine postpartum care. Anticipate first feeding within the hour or whenever infant displays feeding cues. Continue skin to skin. Prior discussion with mother indicates that feeding plan is Breast feeding . Educated mother on importance of exclusive breastfeeding, expected feeding readiness cues and encouraged her to observe for these cues while rooming in. Informed her that breastfeeding assistance would be provided.

## 2019-06-24 NOTE — OP NOTE
[facility]  Full operative note    Patient Name:Darlene Johnson  MRN: [id number]  YOB: 1990  Surgery Date: 2019    Surgeon: Concetta Darby MD  Assistant: Tina Posadas NP who assisted with retraction, uterine fundal pressure application to facilitate delivery of infant from hysterotomy incision.     Pre-operative Diagnosis: 1) Intrauterine pregnancy at 40w2d 2) Category 2 fetal heart tracing, remote from delivery    Post-operative Diagnosis: Viable female infant, delivered  Procedure: Primary low transverse  section via Pfannenstiel skin incision with two layer uterine closure    Anesthesia: Epidural  FRA Score: 2    EBL: 629 mL plus 220 mL of lochia following procedure  IV fluids: 1300 mL crystalloids  Urine Output: 600 ml yellow urine at the end of the procedure    Complications: None  Specimen: None  Drains: Guzman catheter    Findings: Viable female infant delivered in cephalic presentation at 1913 on 2019. Nuchal cord tight x 1 that was reduced. Thick meconium stained amniotic fluid on hysterotomy. Weight 8 lbs 13 oz. Apgar 8 and 8 at 1 and 5 minutes, respectively. Normal appearing uterus, bilateral fallopian tubes and ovaries.     Indication: Ms.Nateal Johnson 29 year old  presented at 40w2d for SROM with thick meconium stained fluid. She progressed in labor spontaneously, however, there were occasional spontaneous decelerations that occurred with the earlier occurrences recovering to normal baseline with moderate variability, but the last spontaneous decelerations was followed by recovery to mickie baseline but absent to minimal variability. Given remoteness of vaginal delivery as she was 4 cm, category 2 fetal hear tracing and ongoing thick meconium stained amniotic fluid, options were discussed including expectant management vs primary  section. Patient elected to proceed with  section.   Risks reviewed with patient including  but not limited to bleeding, need for blood transfusion, infection, injury to surrounding organs (ie bowel/intestines, bladder, ureters, major blood vessels and nerves). If any of these organs are injured, then we identify it and try to fix it. If we cant fix it ourselves, then we consult surgeons that specialize in those areas that are damaged and they fix it for us. Unintended injuries can go unnoticed at the time of surgery as well, and present with complications days to weeks later. Also discussed fetal injury and  hysterectomy for life threatening bleeding. Patient conveyed understanding risks and agrees to proceed. She signed the consent form.     Technique: After signing the consent form, the patient was taken to the operating room where epidural anesthesia was re-bolused and found to be adequate. She was then positioned in the supine position with a leftward tilt. She was then prepped and draped in the usual sterile fashion.  A formal TIME-OUT was conducted with correct identification of the patient and procedure being performed. The site of incision was tested for adequate anesthesia before incision was made and verified with the anesthesia staff and members.  A Pfannenstiel skin incision was made with the scalpel and carried down to the underlying fascia with the scalpel. The fascia was incised at the midline and extended laterally with the Bates scissors. Kocher clamps were applied to the superior and inferior aspect of the fascia, lifted and tented up so as to bluntly and sharply dissect the fascia from the underlying rectus muscles. The rectus muscles were then  at the midline in a blunt fashion. The peritoneum was identified and entered in a blunt fashion. The peritoneum was extended superiorly and inferiorly by stretching and sharp dissection with careful attention to avoid injury to the bowel and bladder. Once adequate extension of the peritoneum was achieved to allow for eventual  delivery of the baby, the Delano 0 retractor was inserted into the peritoneum. A bladder flap was created using Metzenbaum scissors.       A lower uterine segment incision was made with the scalpel and extended laterally in a digital blunt fashion. The head was grasped, elevated and delivered along with the rest of the infant through the hysterotomy without difficulty. The cord was clamped, cut and handed off to the waiting nursery team. Cord gases were collected and sent.       The placenta was delivered spontaneously using gentle traction of the cord. The uterus was exteriorized and cleared of all clots and debris. Gentle massage was employed to achieve firmness to the uterus along with the instillation of uterotonics in the way of IV Pitocin in normal saline and IM methergine. The hysterotomy incision was repaired with 0 Vicryl in a running locked fashion. An imbricating layer along the repaired hysterotomy was made using 0 Monocryl in a running fashion. The repaired hysterotomy incision was inspected for hemostasis and deemed adequate.        The posterior cul-de-sac was irrigated and suctioned, and the uterus returned to the abdomen. The repaired hysterotomy was inspected for adequate hemostasis. The Delano 0 retraction was removed from the peritoneum. Sepra film was applied over the repaired hysterotomy incision and the uterine fundus.       The peritoneum was approximated with 2-0 Vicryl in a running fashion. The rectus muscles were inspected for adequate hemostasis and not re-approximated. The fascia layer was reapproximated using 0 Vicryl in a running fashion. The subcutaneous layer was irrigated, inspected for adequate hemostasis and re-approximated using 3-0 plain gut in interrupted  fashion in one layers. The skin was closed with subcuticular fashion using 4-0 Vicryl.       Sponge, laps, needle counts were correct times 2. The patient tolerated the procedure well and was taken to the postpartum area in  stable condition. IV ancef and azithromycin were given prior to skin incision.    Concetta Darby MD  [facility]

## 2019-06-24 NOTE — ADDENDUM NOTE
Addendum  created 06/23/19 2033 by Brittany Arshad APRN CRNA    Intraprocedure LDAs edited, LDA properties accepted

## 2019-06-25 PROCEDURE — 25000132 ZZH RX MED GY IP 250 OP 250 PS 637: Performed by: OBSTETRICS & GYNECOLOGY

## 2019-06-25 PROCEDURE — 12000000 ZZH R&B MED SURG/OB

## 2019-06-25 RX ADMIN — OXYCODONE HYDROCHLORIDE 5 MG: 5 TABLET ORAL at 15:07

## 2019-06-25 RX ADMIN — SENNOSIDES AND DOCUSATE SODIUM 1 TABLET: 8.6; 5 TABLET ORAL at 21:03

## 2019-06-25 RX ADMIN — IBUPROFEN 800 MG: 800 TABLET ORAL at 15:07

## 2019-06-25 RX ADMIN — ACETAMINOPHEN 975 MG: 325 TABLET, FILM COATED ORAL at 05:50

## 2019-06-25 RX ADMIN — IBUPROFEN 800 MG: 800 TABLET ORAL at 08:33

## 2019-06-25 RX ADMIN — OXYCODONE HYDROCHLORIDE 5 MG: 5 TABLET ORAL at 23:10

## 2019-06-25 RX ADMIN — ACETAMINOPHEN 975 MG: 325 TABLET, FILM COATED ORAL at 23:10

## 2019-06-25 RX ADMIN — PRENATAL VIT W/ FE FUMARATE-FA TAB 27-0.8 MG 1 TABLET: 27-0.8 TAB at 08:34

## 2019-06-25 RX ADMIN — OXYCODONE HYDROCHLORIDE 10 MG: 5 TABLET ORAL at 08:33

## 2019-06-25 RX ADMIN — OXYCODONE HYDROCHLORIDE 5 MG: 5 TABLET ORAL at 12:12

## 2019-06-25 RX ADMIN — ACETAMINOPHEN 975 MG: 325 TABLET, FILM COATED ORAL at 14:30

## 2019-06-25 RX ADMIN — IBUPROFEN 800 MG: 800 TABLET ORAL at 21:03

## 2019-06-25 RX ADMIN — OXYCODONE HYDROCHLORIDE 5 MG: 5 TABLET ORAL at 17:59

## 2019-06-25 NOTE — PROGRESS NOTES
PPD#2  Doing well. Pain well controlled on IV pain medication. Tolerating regular diet. Voiding without difficulty. Passing flatus. Has yet to ambulate. Lochia is scant. Breast feeding.     Vitals:    06/24/19 1209 06/24/19 1600 06/25/19 0000 06/25/19 0836   BP: 127/52 112/61 123/66 141/67   Pulse: 80 88 80    Resp: 16 16 18 18   Temp:  98  F (36.7  C) 98.2  F (36.8  C) 97.2  F (36.2  C)   TempSrc:  Oral Oral Axillary   SpO2: 99% 97% 97% 98%       General Appearance: NAD  Abdomen: Soft, NT, ND. Fundus firm at U-2  Incision: Bandaged and appears dry   Extremities: NT, trace edema    Hemoglobin   Date Value Ref Range Status   06/24/2019 8.9 (L) 11.7 - 15.7 g/dL Final   06/23/2019 11.8 11.7 - 15.7 g/dL Final   ]    A/P: 29 year old  PPD#2 s/p PLTCS for fetal intolerance of labor. Hemodynamically stable.   -- routine post-op care  -- encouraged to ambulate  -- anticipate discharge on POD#3 when meeting discharge goals    Concetta Darby MD  [facility]

## 2019-06-25 NOTE — PROGRESS NOTES
Pt denies pain currently.  Pt was given pain meds anticipating getting up.  Pt is planning on taking a shower and then taking off the dressing. She stated breastfeeding is going well.  Will assist as needed.

## 2019-06-26 VITALS
OXYGEN SATURATION: 98 % | HEART RATE: 77 BPM | DIASTOLIC BLOOD PRESSURE: 66 MMHG | SYSTOLIC BLOOD PRESSURE: 129 MMHG | TEMPERATURE: 98.5 F | RESPIRATION RATE: 18 BRPM

## 2019-06-26 PROCEDURE — 25000132 ZZH RX MED GY IP 250 OP 250 PS 637: Performed by: OBSTETRICS & GYNECOLOGY

## 2019-06-26 RX ORDER — OXYCODONE HYDROCHLORIDE 5 MG/1
5-10 TABLET ORAL
Qty: 15 TABLET | Refills: 0 | Status: SHIPPED | OUTPATIENT
Start: 2019-06-26

## 2019-06-26 RX ORDER — AMOXICILLIN 250 MG
1 CAPSULE ORAL 2 TIMES DAILY PRN
Qty: 20 TABLET | Refills: 1 | Status: SHIPPED | OUTPATIENT
Start: 2019-06-26

## 2019-06-26 RX ORDER — IBUPROFEN 800 MG/1
800 TABLET, FILM COATED ORAL EVERY 6 HOURS PRN
Qty: 40 TABLET | Refills: 0 | Status: SHIPPED | OUTPATIENT
Start: 2019-06-26

## 2019-06-26 RX ADMIN — OXYCODONE HYDROCHLORIDE 5 MG: 5 TABLET ORAL at 04:13

## 2019-06-26 RX ADMIN — PRENATAL VIT W/ FE FUMARATE-FA TAB 27-0.8 MG 1 TABLET: 27-0.8 TAB at 08:07

## 2019-06-26 RX ADMIN — IBUPROFEN 800 MG: 800 TABLET ORAL at 09:31

## 2019-06-26 RX ADMIN — IBUPROFEN 800 MG: 800 TABLET ORAL at 03:34

## 2019-06-26 RX ADMIN — ACETAMINOPHEN 975 MG: 325 TABLET, FILM COATED ORAL at 08:08

## 2019-06-26 NOTE — PROGRESS NOTES
[facility] Obstetrics Post-Op / Progress Note         Assessment and Plan:    Assessment:   Post-operative day #3  Low transverse primary  section  L&D complications: Fetal intolerance of labor      Doing well.  Clean wound without signs of infection.  No excessive bleeding  Pain well-controlled.      Plan:   Discharge to home           Interval History:   Doing well.  Pain is well-controlled.  No fevers.  No history of wound drainage, warmth or significant erythema.  Good appetite.  Denies chest pain, shortness of breath, nausea or vomiting.  Ambulatory.  Breastfeeding well.          Significant Problems:    Active Problems:    Supervision of normal first pregnancy    Normal labor    S/P  section            Review of Systems:    The patient denies any chest pain, shortness of breath, excessive pain, fever, chills, purulent drainage from the wound, nausea or vomiting.          Medications:   All medications related to the patient's surgery have been reviewed          Physical Exam:     All vitals stable  Patient Vitals for the past 8 hrs:   BP Temp Temp src Pulse Resp   19 0808 129/66 98.5  F (36.9  C) Oral 77 18     Wound clean and dry with minimal or no drainage.  Surrounding skin with minimal erythema.          Data:     All laboratory data related to this surgery reviewed  Hemoglobin   Date Value Ref Range Status   2019 8.9 (L) 11.7 - 15.7 g/dL Final   2019 11.8 11.7 - 15.7 g/dL Final   2019 11.9 11.7 - 15.7 g/dL Final   2018 13.2 11.7 - 15.7 g/dL Final     No imaging studies have been ordered    Chante Martinez MD

## 2019-06-26 NOTE — DISCHARGE SUMMARY
[facility] Discharge Summary    Darlene Johnson MRN# [id number]   Age: 29 year old YOB: 1990     Date of Admission:  2019  Date of Discharge::  2019  Admitting Physician:  Concetta Darby MD  Discharge Physician:  Chante Martinez MD     Home clinic: [facility]          Admission Diagnoses:   Term pregnancy, in labor  Meconium stained amniotic fluid          Discharge Diagnosis:   Fetal intolerance of labor  Term pregnancy, delivered  S/p primary  section          Procedures:   Procedure(s): Primary low transverse  section       No other procedures performed during this admission           Medications Prior to Admission:     Medications Prior to Admission   Medication Sig Dispense Refill Last Dose     calcium carbonate (TUMS) 500 MG chewable tablet Take 2 chew tab by mouth once as needed for heartburn   2019 at pm     Prenatal Vit-Fe Fumarate-FA (PRENATAL MULTIVITAMIN PLUS IRON) 27-0.8 MG TABS per tablet Take 1 tablet by mouth daily   2019 at am     VITAMIN D, CHOLECALCIFEROL, PO Take 2,000 Units by mouth daily   2019 at am     [DISCONTINUED] aspirin 81 MG tablet Take 81 mg by mouth daily   2019 at am     [DISCONTINUED] FOLIC ACID PO Take 400 mcg by mouth daily   Past Week at ran out             Discharge Medications:     Current Discharge Medication List      START taking these medications    Details   ibuprofen (ADVIL/MOTRIN) 800 MG tablet Take 1 tablet (800 mg) by mouth every 6 hours as needed for other (cramping)  Qty: 40 tablet, Refills: 0    Associated Diagnoses: S/P  section      oxyCODONE (ROXICODONE) 5 MG tablet Take 1-2 tablets (5-10 mg) by mouth every 3 hours as needed  Qty: 15 tablet, Refills: 0    Associated Diagnoses: S/P  section      senna-docusate (SENOKOT-S/PERICOLACE) 8.6-50 MG tablet Take 1 tablet by mouth 2 times daily as needed for constipation  Qty: 20 tablet, Refills: 1     Associated Diagnoses: S/P  section         CONTINUE these medications which have NOT CHANGED    Details   calcium carbonate (TUMS) 500 MG chewable tablet Take 2 chew tab by mouth once as needed for heartburn      Prenatal Vit-Fe Fumarate-FA (PRENATAL MULTIVITAMIN PLUS IRON) 27-0.8 MG TABS per tablet Take 1 tablet by mouth daily      VITAMIN D, CHOLECALCIFEROL, PO Take 2,000 Units by mouth daily         STOP taking these medications       aspirin 81 MG tablet Comments:   Reason for Stopping:         FOLIC ACID PO Comments:   Reason for Stopping:                     Consultations:   No consultations were requested during this admission          Brief History of Labor or Admission:   29 year old  40w2d presented with SROM that occurred at  345, ; fluid was noted to be thick amniotic fluid.   GBS negative status  Cervix on admission was 2/80/-2 at 050,   S/p epidural  Occasional spontaneous decelerations with recovery to normal baseline and moderate variability with exception of last spontaneous deceleration being more prolonged (4 minutes) and recovery to normal baseline but with absent to minimal variability.   Cervix checked following deceleration and was 4/75/-2.   Given Category 2 tracing remote from delivery, options were reviewed with patient which included expectant management vs primary  section.   Patient elected to proceed with  section.   Risks were reviewed with patient with conveyed understanding.   She signed the consent form.      Patient underwent an uncomplicated PLTCS  Viable female infant, delivered in cephalic presentation at .   Tight nuchal cord x 1 that was reduced during delivery.   Thick meconium stained amniotic fluid on hysterotomy.   Weight 8 lbs 13 oz.   Apgar 8 and 8 at 1 and 5 minutes, respectively.   Placenta delivered spontaneously, intact.   Standard IV pitocin (30units in 500 mL normal saline) and IM methergine administered due to  "uterine atony.   Normal appearing uterus, bilateral fallopian tubes and ovaries.    mL   \"Lakelyn\"     Following completion of  section and during externa uterine fundal check, there was 220 mL of blood expressed from the uterus. Additional uterotonics were administered and this included rectal misoprostol, IM Hemabate, and IV tranexamic acid.               Hospital Course:   The patient's hospital course was unremarkable.  She recovered as anticipated and experienced no post-operative complications.  On discharge, her pain was well controlled. Vaginal bleeding is similar to peak menstrual flow.  Voiding without difficulty.  Ambulating well and tolerating a normal diet.  No fever or significant wound drainage.  Breastfeeding well.  Infant is stable.  No bowel movement yet.  She was discharged on post-partum day #3.    Post-partum hemoglobin:   Hemoglobin   Date Value Ref Range Status   2019 8.9 (L) 11.7 - 15.7 g/dL Final             Discharge Instructions and Follow-Up:   Discharge diet: Regular   Discharge activity: No heavy lifting, pushing, pulling for 6 week(s)  Pelvic rest: abstain from intercourse and do not use tampons for 6 week(s)   Discharge follow-up: Follow up with OB in 6 weeks   Wound care: Drink plenty of fluids  Keep wound clean and dry           Discharge Disposition:   Discharged to home      Attestation:  I have reviewed today's vital signs, notes, medications, labs and imaging.    Chante Martinez MD     "

## 2019-06-26 NOTE — PLAN OF CARE
Discharge instructions printed out and gone over with patient and significant other. Patient verbalized understanding and had all questions answered.  Information given to patient on post partum education.   Discharge Medications gone over with patient and prescriptions sent to retail pharmacy for  on D/C.  Signature obtained. Pt escorted out with nursing staff and all belongings in hand to vehicle at 11:30.

## 2019-06-26 NOTE — DISCHARGE INSTRUCTIONS
Postop  Birth Instructions  Discharge diet: Regular   Discharge activity: No heavy lifting, pushing, pulling for 6 week(s)  Pelvic rest: abstain from intercourse and do not use tampons for 6 week(s)   Discharge follow-up: Follow up with OB in 6 weeks   Wound care: Drink plenty of fluids  Keep wound clean and dry       Activity       Do not lift more than 10 pounds for 6 weeks after surgery.  Ask family and friends for help when you need it.    No driving until you have stopped taking your pain medications (usually two weeks after surgery).    No heavy exercise or activity for 6 weeks.  Don't do anything that will put a strain on your surgery site.    Don't strain when using the toilet.  Your care team may prescribe a stool softener if you have problems with your bowel movements.     To care for your incision:       Keep the incision clean and dry.    Do not soak your incision in water. No swimming or hot tubs until it has fully healed. You may soak in the bathtub if the water level is below your incision.    Do not use peroxide, gel, cream, lotion, or ointment on your incision.    Adjust your clothes to avoid pressure on your surgery site (check the elastic in your underwear for example).     You may see a small amount of clear or pink drainage and this is normal.  Check with your health care provider:       If the drainage increases or has an odor.    If the incision reddens, you have swelling, or develop a rash.    If you have increased pain and the medicine we prescribed doesn't help.    If you have a fever above 100.4 F (38 C) with or without chills when placing thermometer under your tongue.   The area around your incision (surgery wound), will feel numb.  This is normal. The numbness should go away in less than a year.     Keep your hands clean:  Always wash your hands before touching your incision (surgery wound). This helps reduce your risk of infection. If your hands aren't dirty, you may use an  alcohol hand-rub to clean your hands. Keep your nails clean and short.    Call your healthcare provider if you have any of these symptoms:       You soak a sanitary pad with blood within 1 hour, or you see blood clots larger than a golf ball.    Bleeding that lasts more than 6 weeks.    Vaginal discharge that smells bad.    Severe pain, cramping or tenderness in your lower belly area.    A need to urinate more frequently (use the toilet more often), more urgently (use the toilet very quickly), or it burns when you urinate.    Nausea and vomiting.    Redness, swelling or pain around a vein in your leg.    Problems breastfeeding or a red or painful area on your breast.    Chest pain and cough or are gasping for air.    Problems with coping with sadness, anxiety or depression. If you have concerns about hurting yourself or the baby, call your provider immediately.      You have questions or concerns after you return home.

## 2019-06-26 NOTE — PLAN OF CARE
Pt is following her postpartum  section pathway with VSS, no signs of infection, is tolerating a regular diet and is able to care for herself and her . Pt reports that is pain is adequately controlled using oral pain pills. Father of the baby is present supportive and involved in baby cares. Discharge orders received. Pt is preparing to go home.

## 2019-06-26 NOTE — PROGRESS NOTES
"Late entry for 6/23/19, pt was comfortable with pain control via epidural. Repositioned frequently left and right side, peanut ball and pillow support. FHT's continuously monitored. FHT's had 4 prolong decels; 0800,1145,1330 and 1640,  approximately 60 sec, adequate recovery to baseline, with interventions of repositioning and fluids. One correlated with decrease in BP after epidural was infusing. Zofran and ephedrine given. Resolved after interventions.   At 1730, a prolong decel of 4 minutes, with recovery to baseline with cat 2 tracing, min to absent variability. MD notified as O2 10 liters simple mask applied, fluid flush and repositioning patient. Discuss POC with pt / MD at bedside.  With the presence of thick meconium stained fluid, prolong decel, cat 2 tracing and unknown tolerance of continuing spontaneous labor being only dialated to 4 and -2 station. Consent for surgical intervention given. Questions answered. Was not an emergent C/S requesting a soon as possible C/S. Cat 1 tracing resumed after interventions. Pre op check list completed. abx and bictra given. FHT\"s in OR were 140's  "

## 2019-06-26 NOTE — PLAN OF CARE
Pt is going into POD # 3 from a  sections. VSS. Up ind and voiding adequate amounts w/o difficulty. FF mid at U/1; scant to small bleeding noted. Abd incision c/d/I with adhesive. Incisional pain controlled with motrin and oxycodone. discharge pending for later today. Will continue to monitor and reassess.    No

## 2019-06-26 NOTE — PLAN OF CARE
Data: Vital signs within normal limits. Postpartum checks within normal limits - see flow record. Patient eating and drinking normally. Patient able to empty bladder independently and is up ambulating. No apparent signs of infection. Incision healing well. Patient performing self cares and is able to care for infant.  Action: Patient medicated during the shift for pain. See MAR. Patient reassessed within 1 hour after each medication and pain was improved - patient stated she was comfortable. Patient education done about discharge teaching. See flow record.  Response: Positive attachment behaviors observed with infant. Support persons 1 present.   Plan: Anticipate discharge on 6/26/19.

## 2019-08-16 ENCOUNTER — PRENATAL OFFICE VISIT (OUTPATIENT)
Dept: OBGYN | Facility: CLINIC | Age: 29
End: 2019-08-16
Payer: COMMERCIAL

## 2019-08-16 VITALS
BODY MASS INDEX: 26.46 KG/M2 | RESPIRATION RATE: 16 BRPM | HEART RATE: 57 BPM | TEMPERATURE: 97.3 F | DIASTOLIC BLOOD PRESSURE: 67 MMHG | WEIGHT: 155 LBS | SYSTOLIC BLOOD PRESSURE: 113 MMHG | HEIGHT: 64 IN

## 2019-08-16 PROBLEM — Z37.9 NORMAL LABOR: Status: RESOLVED | Noted: 2019-06-23 | Resolved: 2019-08-16

## 2019-08-16 PROBLEM — Z34.00 PRENATAL CARE, FIRST PREGNANCY: Status: RESOLVED | Noted: 2018-11-20 | Resolved: 2019-08-16

## 2019-08-16 PROBLEM — Z34.00 SUPERVISION OF NORMAL FIRST PREGNANCY: Status: RESOLVED | Noted: 2019-06-23 | Resolved: 2019-08-16

## 2019-08-16 PROCEDURE — 99207 ZZC POST PARTUM EXAM: CPT | Performed by: OBSTETRICS & GYNECOLOGY

## 2019-08-16 ASSESSMENT — ANXIETY QUESTIONNAIRES
GAD7 TOTAL SCORE: 0
1. FEELING NERVOUS, ANXIOUS, OR ON EDGE: NOT AT ALL
7. FEELING AFRAID AS IF SOMETHING AWFUL MIGHT HAPPEN: NOT AT ALL
2. NOT BEING ABLE TO STOP OR CONTROL WORRYING: NOT AT ALL
6. BECOMING EASILY ANNOYED OR IRRITABLE: NOT AT ALL
3. WORRYING TOO MUCH ABOUT DIFFERENT THINGS: NOT AT ALL
IF YOU CHECKED OFF ANY PROBLEMS ON THIS QUESTIONNAIRE, HOW DIFFICULT HAVE THESE PROBLEMS MADE IT FOR YOU TO DO YOUR WORK, TAKE CARE OF THINGS AT HOME, OR GET ALONG WITH OTHER PEOPLE: NOT DIFFICULT AT ALL
5. BEING SO RESTLESS THAT IT IS HARD TO SIT STILL: NOT AT ALL

## 2019-08-16 ASSESSMENT — PATIENT HEALTH QUESTIONNAIRE - PHQ9
SUM OF ALL RESPONSES TO PHQ QUESTIONS 1-9: 0
5. POOR APPETITE OR OVEREATING: NOT AT ALL

## 2019-08-16 ASSESSMENT — MIFFLIN-ST. JEOR: SCORE: 1413.08

## 2019-08-16 NOTE — NURSING NOTE
"Initial /67 (BP Location: Right arm, Patient Position: Sitting, Cuff Size: Adult Regular)   Pulse 57   Temp 97.3  F (36.3  C) (Tympanic)   Resp 16   Ht 1.626 m (5' 4\")   Wt 70.3 kg (155 lb)   Breastfeeding? Yes   BMI 26.61 kg/m   Estimated body mass index is 26.61 kg/m  as calculated from the following:    Height as of this encounter: 1.626 m (5' 4\").    Weight as of this encounter: 70.3 kg (155 lb). .    Yani Lee CMA    "

## 2019-08-16 NOTE — PROGRESS NOTES
"  SUBJECTIVE:                                                   CC:  Postpartum visit    HPI:  Darlene Johnson is a 29 year old  s/p CS 6 weeks ago who presents for postpartum follow up.      Doing well, breastfeeding is going well  No issues bowel/bladder  Baby is sleeping a lot, even 6 hours overnight last night  Plans condoms for birth control    Wt Readings from Last 3 Encounters:   19 70.3 kg (155 lb)   19 88.2 kg (194 lb 6.4 oz)   19 87.9 kg (193 lb 12.8 oz)     PHQ-9 SCORE 2019   PHQ-9 Total Score 0     BUFFY-7 SCORE 2019   Total Score 0         ROS: 10 point ROS negative other than as listed above in HPI.       Last 3 Pap and HPV Results:   PAP / HPV 2018   PAP NIL         PMH, PSH, Soc Hx, Fam Hx, Meds, and allergies reviewed in Epic.    OBJECTIVE:     /67 (BP Location: Right arm, Patient Position: Sitting, Cuff Size: Adult Regular)   Pulse 57   Temp 97.3  F (36.3  C) (Tympanic)   Resp 16   Ht 1.626 m (5' 4\")   Wt 70.3 kg (155 lb)   Breastfeeding? Yes   BMI 26.61 kg/m        Gen: Healthy appearing female, no acute distress, comfortable.  Well groomed, good eye contact, loving toward infant.    HENT: No scleral injection or icterus  CV: Regular rate  Resp: Normal work of breathing, no cough  Psychiatric: mentation appears normal and affect bright  Inc: c/d/i and well healing  :deferred    ASSESSMENT/PLAN:                                                      1. Routine postpartum follow-up  Doing well, see HPI.  Discussed risk of postpartum anxiety/depression for up to a year.  Condoms for contraception (needed IVF for this pregnancy).  Discussed spacing of at least 1 year.      Shira Brand MD, MPH  Obstetrics and Gynecology     "

## 2019-08-17 ASSESSMENT — ANXIETY QUESTIONNAIRES: GAD7 TOTAL SCORE: 0

## 2020-03-11 ENCOUNTER — HEALTH MAINTENANCE LETTER (OUTPATIENT)
Age: 30
End: 2020-03-11

## 2021-01-03 ENCOUNTER — HEALTH MAINTENANCE LETTER (OUTPATIENT)
Age: 31
End: 2021-01-03

## 2021-01-19 ENCOUNTER — MEDICAL CORRESPONDENCE (OUTPATIENT)
Dept: HEALTH INFORMATION MANAGEMENT | Facility: CLINIC | Age: 31
End: 2021-01-19

## 2021-03-02 ENCOUNTER — MEDICAL CORRESPONDENCE (OUTPATIENT)
Dept: HEALTH INFORMATION MANAGEMENT | Facility: CLINIC | Age: 31
End: 2021-03-02

## 2021-05-04 ENCOUNTER — MEDICAL CORRESPONDENCE (OUTPATIENT)
Dept: HEALTH INFORMATION MANAGEMENT | Facility: CLINIC | Age: 31
End: 2021-05-04

## 2021-06-29 ENCOUNTER — MEDICAL CORRESPONDENCE (OUTPATIENT)
Dept: HEALTH INFORMATION MANAGEMENT | Facility: CLINIC | Age: 31
End: 2021-06-29

## 2021-10-10 ENCOUNTER — HEALTH MAINTENANCE LETTER (OUTPATIENT)
Age: 31
End: 2021-10-10

## 2021-12-16 NOTE — MR AVS SNAPSHOT
"              After Visit Summary   11/20/2018    Darlene Johnson    MRN: [id number]           Patient Information     Date Of Birth          1990        Visit Information        Provider Department      11/20/2018 5:55 PM Dinah Rossi MD [facility]        Today's Diagnoses     Prenatal care, first pregnancy    -  1       Follow-ups after your visit        Your next 10 appointments already scheduled     Nov 28, 2018  2:00 PM CST   New Prenatal with iDnah Rossi MD, [facility]    [address] [address]   [phone]              Who to contact     If you have questions or need follow up information about today's clinic visit or your schedule please contact [facility] directly at [phone].  Normal or non-critical lab and imaging results will be communicated to you by MyChart, letter or phone within 4 business days after the clinic has received the results. If you do not hear from us within 7 days, please contact the clinic through MyChart or phone. If you have a critical or abnormal lab result, we will notify you by phone as soon as possible.  Submit refill requests through [employer] or call your pharmacy and they will forward the refill request to us. Please allow 3 business days for your refill to be completed.          Additional Information About Your Visit        MyChart Information     [employer] lets you send messages to your doctor, view your test results, renew your prescriptions, schedule appointments and more. To sign up, go to [email or web address] . Click on \"Log in\" on the left side of the screen, which will take you to the Welcome page. Then click on \"Sign up Now\" on the right side of the page.     You will be asked to enter the access code listed below, as well as some personal information. Please follow the directions to create your username and password.   " Pt updated via cell vm with lab results.        ----- Message from Aubree Sarabia MD sent at 12/16/2021  3:18 PM CST -----  Normal  Hormones normal menopause  Vit D optimal       Your access code is: [email or web address]  Expires: 2019  7:58 PM     Your access code will  in 90 days. If you need help or a new code, please call your [place] clinic or [phone].        Care EveryWhere ID     This is your Care EveryWhere ID. This could be used by other organizations to access your [place] medical records  [id number]         Blood Pressure from Last 3 Encounters:   No data found for BP    Weight from Last 3 Encounters:   No data found for Wt              Today, you had the following     No orders found for display       Primary Care Provider    None Specified       No primary provider on file.        Equal Access to Services     [facility]: Hadii sergio Simon, reddy landon, gilson gan, minal jalloh . So [facility] [phone].    ATENCIÓN: Si habla español, tiene a riley disposición servicios gratuitos de asistencia lingüística. Llame al [phone].    We comply with applicable federal civil rights laws and [place] laws. We do not discriminate on the basis of race, color, national origin, age, disability, sex, sexual orientation, or gender identity.            Thank you!     Thank you for choosing [facility]  for your care. Our goal is always to provide you with excellent care. Hearing back from our patients is one way we can continue to improve our services. Please take a few minutes to complete the written survey that you may receive in the mail after your visit with us. Thank you!             Your Updated Medication List - Protect others around you: Learn how to safely use, store and throw away your medicines at www.disposemymeds.org.          This list is accurate as of 18  7:58 PM.  Always use your most recent med list.                   Brand Name Dispense Instructions for use Diagnosis    aspirin 81 MG tablet      Take 81 mg by mouth daily        FOLIC ACID PO      Take 400 mcg by mouth daily         prenatal multivitamin plus iron 27-0.8 MG Tabs per tablet      Take 1 tablet by mouth daily        VITAMIN D (CHOLECALCIFEROL) PO      Take 2,000 Units by mouth daily

## 2022-01-29 ENCOUNTER — HEALTH MAINTENANCE LETTER (OUTPATIENT)
Age: 32
End: 2022-01-29

## 2022-09-18 ENCOUNTER — HEALTH MAINTENANCE LETTER (OUTPATIENT)
Age: 32
End: 2022-09-18

## 2023-05-07 ENCOUNTER — HEALTH MAINTENANCE LETTER (OUTPATIENT)
Age: 33
End: 2023-05-07

## (undated) DEVICE — Device

## (undated) DEVICE — SOL NACL 0.9% IRRIG 1000ML BOTTLE 07138-09

## (undated) DEVICE — SU VICRYL 4-0 FS-2 27" J422-H

## (undated) DEVICE — SU PLAIN 3-0 CT 27" 852H

## (undated) DEVICE — SU VICRYL 0 CT-1 36" J946H

## (undated) DEVICE — SPONGE LAP 18X18" X8435

## (undated) DEVICE — BARRIER SEPRAFILM 5X6" SINGLE SHEET 4301-02

## (undated) DEVICE — SU VICRYL 2-0 CT-1 36" UND J945H

## (undated) DEVICE — LINEN BABY BLANKET 5434

## (undated) DEVICE — GLOVE PROTEXIS BLUE W/NEU-THERA 7.0  2D73EB70

## (undated) DEVICE — GLOVE PROTEXIS W/NEU-THERA 7.0  2D73TE70

## (undated) DEVICE — STOCKING SLEEVE COMPRESSION CALF MED

## (undated) DEVICE — PACK C-SECTION LF PL15OTA83B

## (undated) DEVICE — SPONGE LAP 18X18" 1515

## (undated) DEVICE — LABEL MEDICATION SYSTEM  3304

## (undated) DEVICE — SU MONOCRYL 0 CT-1 36" T352H

## (undated) DEVICE — PREP SKIN SCRUB TRAY 4461A

## (undated) DEVICE — GLOVE PROTEXIS MICRO 7.0  2D73PM70

## (undated) DEVICE — DRSG ABDOMINAL 07 1/2X8" 7197D

## (undated) DEVICE — GLOVE PROTEXIS BLUE W/NEU-THERA 6.5  2D73EB65

## (undated) DEVICE — SOL WATER IRRIG 1000ML BOTTLE 07139-09

## (undated) DEVICE — ADHESIVE SWIFTSET 0.8ML OCTYL SS6

## (undated) DEVICE — BLADE CLIPPER 4406

## (undated) DEVICE — GLOVE PROTEXIS W/NEU-THERA 6.5  2D73TE65

## (undated) RX ORDER — MISOPROSTOL 200 UG/1
TABLET ORAL
Status: DISPENSED
Start: 2019-06-23

## (undated) RX ORDER — PHENYLEPHRINE HCL IN 0.9% NACL 1 MG/10 ML
SYRINGE (ML) INTRAVENOUS
Status: DISPENSED
Start: 2019-06-23

## (undated) RX ORDER — OXYTOCIN/0.9 % SODIUM CHLORIDE 30/500 ML
PLASTIC BAG, INJECTION (ML) INTRAVENOUS
Status: DISPENSED
Start: 2019-06-23

## (undated) RX ORDER — ONDANSETRON 2 MG/ML
INJECTION INTRAMUSCULAR; INTRAVENOUS
Status: DISPENSED
Start: 2019-06-23

## (undated) RX ORDER — LIDOCAINE HCL/EPINEPHRINE/PF 2%-1:200K
VIAL (ML) INJECTION
Status: DISPENSED
Start: 2019-06-23

## (undated) RX ORDER — MORPHINE SULFATE 1 MG/ML
INJECTION, SOLUTION EPIDURAL; INTRATHECAL; INTRAVENOUS
Status: DISPENSED
Start: 2019-06-23

## (undated) RX ORDER — METHYLERGONOVINE MALEATE 0.2 MG/ML
INJECTION INTRAVENOUS
Status: DISPENSED
Start: 2019-06-23